# Patient Record
Sex: FEMALE | Race: WHITE | NOT HISPANIC OR LATINO | Employment: OTHER | URBAN - METROPOLITAN AREA
[De-identification: names, ages, dates, MRNs, and addresses within clinical notes are randomized per-mention and may not be internally consistent; named-entity substitution may affect disease eponyms.]

---

## 2017-06-29 ENCOUNTER — HOSPITAL ENCOUNTER (EMERGENCY)
Facility: HOSPITAL | Age: 67
Discharge: HOME | End: 2017-06-30
Attending: EMERGENCY MEDICINE
Payer: MEDICARE

## 2017-06-29 DIAGNOSIS — E87.6 HYPOKALEMIA: ICD-10-CM

## 2017-06-29 DIAGNOSIS — N12 PYELONEPHRITIS: Primary | ICD-10-CM

## 2017-06-29 LAB
ALBUMIN SERPL BCP-MCNC: 2.8 G/DL
ALP SERPL-CCNC: 105 U/L
ALT SERPL W/O P-5'-P-CCNC: 20 U/L
ANION GAP SERPL CALC-SCNC: 10 MMOL/L
AST SERPL-CCNC: 17 U/L
BACTERIA #/AREA URNS HPF: ABNORMAL /HPF
BASOPHILS # BLD AUTO: 0.02 K/UL
BASOPHILS NFR BLD: 0.1 %
BILIRUB SERPL-MCNC: 1 MG/DL
BILIRUB UR QL STRIP: NEGATIVE
BUN SERPL-MCNC: 21 MG/DL
CALCIUM SERPL-MCNC: 9.9 MG/DL
CHLORIDE SERPL-SCNC: 105 MMOL/L
CLARITY UR: ABNORMAL
CO2 SERPL-SCNC: 27 MMOL/L
COLOR UR: ABNORMAL
CREAT SERPL-MCNC: 1.2 MG/DL
DIFFERENTIAL METHOD: ABNORMAL
EOSINOPHIL # BLD AUTO: 0 K/UL
EOSINOPHIL NFR BLD: 0.1 %
ERYTHROCYTE [DISTWIDTH] IN BLOOD BY AUTOMATED COUNT: 13.2 %
EST. GFR  (AFRICAN AMERICAN): 54 ML/MIN/1.73 M^2
EST. GFR  (NON AFRICAN AMERICAN): 47 ML/MIN/1.73 M^2
GLUCOSE SERPL-MCNC: 130 MG/DL
GLUCOSE UR QL STRIP: NEGATIVE
HCT VFR BLD AUTO: 35.5 %
HGB BLD-MCNC: 12.1 G/DL
HGB UR QL STRIP: ABNORMAL
HYALINE CASTS #/AREA URNS LPF: ABNORMAL /LPF
KETONES UR QL STRIP: NEGATIVE
LEUKOCYTE ESTERASE UR QL STRIP: ABNORMAL
LIPASE SERPL-CCNC: 8 U/L
LYMPHOCYTES # BLD AUTO: 0.7 K/UL
LYMPHOCYTES NFR BLD: 4.9 %
MCH RBC QN AUTO: 30.5 PG
MCHC RBC AUTO-ENTMCNC: 34.1 %
MCV RBC AUTO: 89 FL
MICROSCOPIC COMMENT: ABNORMAL
MONOCYTES # BLD AUTO: 1 K/UL
MONOCYTES NFR BLD: 7 %
NEUTROPHILS # BLD AUTO: 12.4 K/UL
NEUTROPHILS NFR BLD: 87.9 %
NITRITE UR QL STRIP: POSITIVE
PH UR STRIP: 5 [PH] (ref 5–8)
PLATELET # BLD AUTO: 179 K/UL
PMV BLD AUTO: 9.5 FL
POTASSIUM SERPL-SCNC: 2.7 MMOL/L
PROT SERPL-MCNC: 7.1 G/DL
PROT UR QL STRIP: ABNORMAL
RBC # BLD AUTO: 3.97 M/UL
RBC #/AREA URNS HPF: 12 /HPF (ref 0–4)
SODIUM SERPL-SCNC: 142 MMOL/L
SP GR UR STRIP: 1.02 (ref 1–1.03)
SQUAMOUS #/AREA URNS HPF: 9 /HPF
URN SPEC COLLECT METH UR: ABNORMAL
UROBILINOGEN UR STRIP-ACNC: ABNORMAL EU/DL
WBC # BLD AUTO: 14.19 K/UL
WBC #/AREA URNS HPF: >100 /HPF (ref 0–5)

## 2017-06-29 PROCEDURE — 25000003 PHARM REV CODE 250: Performed by: EMERGENCY MEDICINE

## 2017-06-29 PROCEDURE — 81000 URINALYSIS NONAUTO W/SCOPE: CPT

## 2017-06-29 PROCEDURE — 96365 THER/PROPH/DIAG IV INF INIT: CPT | Mod: 59

## 2017-06-29 PROCEDURE — 96361 HYDRATE IV INFUSION ADD-ON: CPT

## 2017-06-29 PROCEDURE — 87186 SC STD MICRODIL/AGAR DIL: CPT

## 2017-06-29 PROCEDURE — 80053 COMPREHEN METABOLIC PANEL: CPT

## 2017-06-29 PROCEDURE — 87086 URINE CULTURE/COLONY COUNT: CPT

## 2017-06-29 PROCEDURE — 63600175 PHARM REV CODE 636 W HCPCS: Performed by: EMERGENCY MEDICINE

## 2017-06-29 PROCEDURE — 87088 URINE BACTERIA CULTURE: CPT

## 2017-06-29 PROCEDURE — 96375 TX/PRO/DX INJ NEW DRUG ADDON: CPT

## 2017-06-29 PROCEDURE — 83690 ASSAY OF LIPASE: CPT

## 2017-06-29 PROCEDURE — 99285 EMERGENCY DEPT VISIT HI MDM: CPT | Mod: 25

## 2017-06-29 PROCEDURE — 85025 COMPLETE CBC W/AUTO DIFF WBC: CPT

## 2017-06-29 PROCEDURE — 87077 CULTURE AEROBIC IDENTIFY: CPT

## 2017-06-29 RX ORDER — AZITHROMYCIN 250 MG/1
250 TABLET, FILM COATED ORAL DAILY
COMMUNITY
End: 2020-12-11 | Stop reason: CLARIF

## 2017-06-29 RX ORDER — ACETAMINOPHEN 500 MG
1000 TABLET ORAL
Status: COMPLETED | OUTPATIENT
Start: 2017-06-29 | End: 2017-06-29

## 2017-06-29 RX ORDER — METHYLPREDNISOLONE SODIUM SUCCINATE 125 MG/2ML
125 INJECTION INTRAMUSCULAR; INTRAVENOUS
Status: COMPLETED | OUTPATIENT
Start: 2017-06-29 | End: 2017-06-29

## 2017-06-29 RX ORDER — GLUCOSAMINE/CHONDRO SU A 500-400 MG
1 TABLET ORAL 3 TIMES DAILY
COMMUNITY
End: 2020-12-11 | Stop reason: CLARIF

## 2017-06-29 RX ORDER — DIPHENHYDRAMINE HYDROCHLORIDE 50 MG/ML
25 INJECTION INTRAMUSCULAR; INTRAVENOUS
Status: COMPLETED | OUTPATIENT
Start: 2017-06-29 | End: 2017-06-29

## 2017-06-29 RX ADMIN — SODIUM CHLORIDE 1000 ML: 0.9 INJECTION, SOLUTION INTRAVENOUS at 09:06

## 2017-06-29 RX ADMIN — IOHEXOL 70 ML: 350 INJECTION, SOLUTION INTRAVENOUS at 11:06

## 2017-06-29 RX ADMIN — ACETAMINOPHEN 1000 MG: 500 TABLET ORAL at 09:06

## 2017-06-29 RX ADMIN — DIPHENHYDRAMINE HYDROCHLORIDE 25 MG: 50 INJECTION, SOLUTION INTRAMUSCULAR; INTRAVENOUS at 11:06

## 2017-06-29 RX ADMIN — METHYLPREDNISOLONE SODIUM SUCCINATE 125 MG: 125 INJECTION, POWDER, FOR SOLUTION INTRAMUSCULAR; INTRAVENOUS at 11:06

## 2017-06-30 VITALS
BODY MASS INDEX: 36.37 KG/M2 | SYSTOLIC BLOOD PRESSURE: 129 MMHG | HEART RATE: 60 BPM | OXYGEN SATURATION: 95 % | RESPIRATION RATE: 18 BRPM | TEMPERATURE: 99 F | WEIGHT: 213 LBS | HEIGHT: 64 IN | DIASTOLIC BLOOD PRESSURE: 87 MMHG

## 2017-06-30 PROCEDURE — 63600175 PHARM REV CODE 636 W HCPCS: Performed by: EMERGENCY MEDICINE

## 2017-06-30 PROCEDURE — 25500020 PHARM REV CODE 255: Performed by: EMERGENCY MEDICINE

## 2017-06-30 RX ORDER — CEPHALEXIN 500 MG/1
500 CAPSULE ORAL EVERY 8 HOURS
Qty: 21 CAPSULE | Refills: 0 | Status: SHIPPED | OUTPATIENT
Start: 2017-06-30 | End: 2017-07-07

## 2017-06-30 RX ORDER — ONDANSETRON 4 MG/1
4 TABLET, ORALLY DISINTEGRATING ORAL EVERY 8 HOURS PRN
Qty: 12 TABLET | Refills: 0 | Status: SHIPPED | OUTPATIENT
Start: 2017-06-30 | End: 2020-12-11 | Stop reason: CLARIF

## 2017-06-30 RX ADMIN — CEFTRIAXONE 1 G: 1 INJECTION, SOLUTION INTRAVENOUS at 01:06

## 2017-06-30 NOTE — ED PROVIDER NOTES
"Encounter Date: 6/29/2017    SCRIBE #1 NOTE: I, Maritza Connelly, am scribing for, and in the presence of,  Leandro Kent MD. I have scribed the following portions of the note - Other sections scribed: HPI/ROS.       History     Chief Complaint   Patient presents with    Fever     fever off and on x 2 days with abd pain and belching. Denies N/V. Dx with URI yesterday and on zithromax rx.     Abdominal Pain     CC: Abdominal Pain    HPI: 67 y.o. F with a PMHx of arthritis, HLD, HTN, pulmonary HTN, leukemia, and sleep apnea presents to the ED c/o moderate epigastric abdominal pain with associated "dry heaves" (occasionally), decreased appetite, belching, and increased flatulence x 2 days. Pt states she feels hungry but "doesn't want it" once she starts eating. Pt also reports of chills, an intermittent fever, and slight post-nasal drip. Pt was dxed with URI and started on Z-franklin yesterday by her PCP. Pt denies H/O acid reflux. Pt otherwise denies emesis, nausea, CP, diarrhea, and new urinary symptoms.        The history is provided by the patient.     Review of patient's allergies indicates:   Allergen Reactions    Amoxicillin Rash     Past Medical History:   Diagnosis Date    Arthritis     Hyperlipidemia     Hypertension     Leukemia     Pulmonary HTN     Sleep apnea 2011    pt usuesa sleep apnea machine     Past Surgical History:   Procedure Laterality Date    bladder lift      BREAST SURGERY      CHOLECYSTECTOMY      HYSTERECTOMY      TONSILLECTOMY      TOTAL HIP ARTHROPLASTY       History reviewed. No pertinent family history.  Social History   Substance Use Topics    Smoking status: Never Smoker    Smokeless tobacco: Never Used    Alcohol use No     Review of Systems   Constitutional: Positive for appetite change, chills and fever.   HENT: Positive for postnasal drip. Negative for congestion, ear pain and sore throat.         (+) occasional "dry heaving" without nausea   Eyes: Negative for pain. "   Respiratory: Negative for cough and shortness of breath.    Cardiovascular: Negative for chest pain.   Gastrointestinal: Positive for abdominal pain. Negative for blood in stool, diarrhea, nausea and vomiting.   Genitourinary: Negative for dysuria and hematuria.   Musculoskeletal: Negative for neck stiffness.   Skin: Negative for rash.   Neurological: Negative for headaches.       Physical Exam     Initial Vitals [06/29/17 1922]   BP Pulse Resp Temp SpO2   132/68 88 20 99.7 °F (37.6 °C) 97 %      MAP       89.33         Physical Exam    Nursing note and vitals reviewed.  Constitutional: She appears well-developed and well-nourished. She is not diaphoretic. No distress.   HENT:   Head: Normocephalic and atraumatic.   Nose: Nose normal.   Mouth/Throat: Oropharynx is clear and moist. No oropharyngeal exudate.   Eyes: Conjunctivae and EOM are normal. Pupils are equal, round, and reactive to light. No scleral icterus.   Neck: Normal range of motion. Neck supple. No thyromegaly present. No tracheal deviation present.   Cardiovascular: Normal rate, regular rhythm and normal heart sounds. Exam reveals no gallop and no friction rub.    No murmur heard.  Pulmonary/Chest: Breath sounds normal. No respiratory distress. She has no wheezes. She has no rhonchi. She has no rales.   Abdominal: Soft. Bowel sounds are normal. She exhibits no distension and no mass. There is tenderness (epigastric only). There is no rebound and no guarding.   Musculoskeletal: Normal range of motion. She exhibits no edema or tenderness.   Lymphadenopathy:     She has no cervical adenopathy.   Neurological: She is alert and oriented to person, place, and time. She has normal strength. No cranial nerve deficit or sensory deficit.   Skin: Skin is warm and dry. No rash noted. No erythema. No pallor.   Psychiatric: She has a normal mood and affect. Her behavior is normal. Thought content normal.         ED Course   Procedures  Labs Reviewed   CBC W/ AUTO  "DIFFERENTIAL - Abnormal; Notable for the following:        Result Value    WBC 14.19 (*)     RBC 3.97 (*)     Hematocrit 35.5 (*)     Gran # 12.4 (*)     Lymph # 0.7 (*)     Gran% 87.9 (*)     Lymph% 4.9 (*)     All other components within normal limits   COMPREHENSIVE METABOLIC PANEL - Abnormal; Notable for the following:     Potassium 2.7 (*)     Glucose 130 (*)     Albumin 2.8 (*)     eGFR if  54 (*)     eGFR if non  47 (*)     All other components within normal limits    Narrative:      Potassium  critical result(s) called and verbal readback obtained   from Liset Crowe, 06/29/2017 22:54   URINALYSIS - Abnormal; Notable for the following:     Appearance, UA Cloudy (*)     Protein, UA 2+ (*)     Occult Blood UA 3+ (*)     Nitrite, UA Positive (*)     Urobilinogen, UA 4.0-6.0 (*)     Leukocytes, UA 3+ (*)     All other components within normal limits   URINALYSIS MICROSCOPIC - Abnormal; Notable for the following:     RBC, UA 12 (*)     WBC, UA >100 (*)     Bacteria, UA Many (*)     All other components within normal limits   CULTURE, URINE   LIPASE             Medical Decision Making:   Initial Assessment:   67-year-old female presents complaining of epigastric pain with what she describes as "dry heaving".  She does state the small amount of liquid comes up with her dry heaves.  Physical examination reveals mild epigastric tenderness with no other significant abnormality.   Differential Diagnosis:   Broad, includes gastritis, peptic ulcer disease, mesenteric ischemia, small bowel obstruction, enteritis  Independently Interpreted Test(s):   I have ordered and independently interpreted X-rays - see summary below.       <> Summary of X-Ray Reading(s): CT abdomen: Findings consistent with left-sided pyelonephritis  ED Management:  Workup reveals nitrite positive urinary tract infection along with mild leukocytosis and findings consistent with pyelonephritis on CT.  Patient reports that " she feels much better after treatment in the emergency department.  Her vital signs are normal, she is well-appearing, she denies pain or nausea.  I do not believe that she requires treatment in the hospital for this condition given the many reassuring findings above.  I will discharge with prescription for antibiotic and anti-medic, along with recommendations of increased potassium intake, copious oral fluids, and with return precautions.    Patient counseled regarding test results, recommendations for supportive care, and need for follow-up.  Return precautions given.              Scribe Attestation:   Scribe #1: I performed the above scribed service and the documentation accurately describes the services I performed. I attest to the accuracy of the note.    Attending Attestation:           Physician Attestation for Scribe:  Physician Attestation Statement for Scribe #1: I, Leandro Kent MD , reviewed documentation, as scribed by Maritza Connelly in my presence, and it is both accurate and complete.                 ED Course     Clinical Impression:   The primary encounter diagnosis was Pyelonephritis. A diagnosis of Hypokalemia was also pertinent to this visit.                           Leandro Kent III, MD  06/30/17 0512

## 2017-06-30 NOTE — DISCHARGE INSTRUCTIONS
You may take up to 800 mg of ibuprofen every 8 hours as needed for pain or fever.  You may also take up to 1000 mg of Tylenol every 6 hours as needed for pain or fever.  Return to the emergency department if you develop worsening pain, persistent vomiting, or for any new or worsening medical concerns.

## 2017-06-30 NOTE — ED TRIAGE NOTES
Pt c/o fever, gas, belching, and abdominal pain for the past 2 days.  Denies Vomiting and diarrhea, does report dry heaving.  Dx with URI yesterday and on zithromax rx.

## 2017-07-01 LAB — BACTERIA UR CULT: NORMAL

## 2018-09-27 DIAGNOSIS — I67.2 CEREBRAL ATHEROSCLEROSIS: Primary | ICD-10-CM

## 2018-11-05 ENCOUNTER — HOSPITAL ENCOUNTER (OUTPATIENT)
Dept: RADIOLOGY | Facility: HOSPITAL | Age: 68
Discharge: HOME | End: 2018-11-05
Attending: FAMILY MEDICINE
Payer: MEDICARE

## 2018-11-05 DIAGNOSIS — I67.2 CEREBRAL ATHEROSCLEROSIS: ICD-10-CM

## 2018-11-05 PROCEDURE — 93880 EXTRACRANIAL BILAT STUDY: CPT | Mod: 26,,, | Performed by: RADIOLOGY

## 2018-11-05 PROCEDURE — 93880 EXTRACRANIAL BILAT STUDY: CPT | Mod: TC

## 2018-11-07 ENCOUNTER — TELEPHONE (OUTPATIENT)
Dept: OTOLARYNGOLOGY | Facility: CLINIC | Age: 68
End: 2018-11-07

## 2018-11-07 NOTE — TELEPHONE ENCOUNTER
----- Message from Liudmila Randall sent at 11/7/2018  9:56 AM CST -----  Contact: Self/ [phone]  Pt calling to request urgent appt today. Says she has been taking medicine all week. Thinks it is a bad sinus infection with a sore throat. Please call to advise. Thank you.

## 2018-11-07 NOTE — TELEPHONE ENCOUNTER
----- Message from Jason Fox sent at 11/7/2018 11:48 AM CST -----  Contact: Self/[phone]  The patient would like someone to contact her at [phone]. The patient stated she would like a sooner appt.        Thank you

## 2018-11-07 NOTE — TELEPHONE ENCOUNTER
Notified patient that we have no availible appt today, soonest is the end of the month. Advised her to see her pcp or urgent care   patient

## 2018-12-11 ENCOUNTER — TELEPHONE (OUTPATIENT)
Dept: OTOLARYNGOLOGY | Facility: CLINIC | Age: 68
End: 2018-12-11

## 2018-12-11 NOTE — TELEPHONE ENCOUNTER
----- Message from Amelia Kendrick sent at 12/11/2018 10:04 AM CST -----  Contact: Carol [phone]  The patient is requesting an appointment with Dr. Soto as soon as possible. She reports that she may have a sinus infection. Please call at your earliest convenience.

## 2018-12-11 NOTE — TELEPHONE ENCOUNTER
Called patient and advised her to see her PCP or Urgent Care due to not having any appts for today or tomorrow.

## 2019-08-20 ENCOUNTER — HOSPITAL ENCOUNTER (EMERGENCY)
Facility: HOSPITAL | Age: 69
Discharge: HOME | End: 2019-08-20
Attending: EMERGENCY MEDICINE
Payer: MEDICARE

## 2019-08-20 VITALS
RESPIRATION RATE: 19 BRPM | DIASTOLIC BLOOD PRESSURE: 60 MMHG | WEIGHT: 225 LBS | SYSTOLIC BLOOD PRESSURE: 126 MMHG | BODY MASS INDEX: 38.41 KG/M2 | HEART RATE: 55 BPM | TEMPERATURE: 99 F | OXYGEN SATURATION: 96 % | HEIGHT: 64 IN

## 2019-08-20 DIAGNOSIS — R60.9 SWELLING: Primary | ICD-10-CM

## 2019-08-20 PROCEDURE — 99284 EMERGENCY DEPT VISIT MOD MDM: CPT | Mod: 25

## 2019-08-20 RX ORDER — FUROSEMIDE 20 MG/1
20 TABLET ORAL 2 TIMES DAILY
COMMUNITY
End: 2020-12-11 | Stop reason: CLARIF

## 2019-08-20 NOTE — ED PROVIDER NOTES
"Encounter Date: 8/20/2019    SCRIBE #1 NOTE: I, Carson Miramontes, am scribing for, and in the presence of,  Wilfredo Huynh MD. I have scribed the following portions of the note - Other sections scribed: HPI, ROS, PE, DD.       History     Chief Complaint   Patient presents with    Leg Swelling     Pt reports left leg swelling and pain behind left knee     CC: Leg Swelling     HPI: This 69 y.o F with a hx of Arthritis, HLD, HTN, Leukemia and Pulmonary HTN presents to the ED from Urgent Care c/o acute onset of gradually worsening and LLE swelling with associated intermittent and moderate (7/10) L posterior knee pain. She also notes noticing a "knot" on the posterior L knee last night. Her pain is worse when attempting to ambulate and with L foot flexion. She was recently administered an injection by Orthopedics which she states worsened her chronic knee pain. She also notes that she traveled to and from [place] this weekend. No recent surgeries. She is compliant with her Rx'ed Lasix. The pt denies hip pain, abdominal pain, chest pain, worsening SOB, fever, dysuria and rash. No prior tx.    The history is provided by the patient. No  was used.     Review of patient's allergies indicates:   Allergen Reactions    Amoxicillin Rash     Past Medical History:   Diagnosis Date    Arthritis     Hyperlipidemia     Hypertension     Leukemia     Pulmonary HTN     Sleep apnea 2011    pt usuesa sleep apnea machine     Past Surgical History:   Procedure Laterality Date    bladder lift      BREAST SURGERY      CHOLECYSTECTOMY      HYSTERECTOMY      leukemia      TONSILLECTOMY      TOTAL HIP ARTHROPLASTY       History reviewed. No pertinent family history.  Social History     Tobacco Use    Smoking status: Never Smoker    Smokeless tobacco: Never Used   Substance Use Topics    Alcohol use: No    Drug use: No     Review of Systems   Constitutional: Negative for fever.   Respiratory: Negative " for shortness of breath.    Cardiovascular: Negative for chest pain.   Gastrointestinal: Negative for abdominal pain.   Musculoskeletal:        (+) LLE swelling  (+) LLE pain  (+) L posterior knee pain  (-) hip pain   Skin: Negative for rash.       Physical Exam     Initial Vitals [08/20/19 1151]   BP Pulse Resp Temp SpO2   (!) 173/77 64 20 98.1 °F (36.7 °C) 99 %      MAP       --         Physical Exam    Nursing note and vitals reviewed.  Constitutional: She is not diaphoretic. No distress.   HENT:   Head: Normocephalic and atraumatic.   Mouth/Throat: Oropharynx is clear and moist.   Eyes: EOM are normal. Pupils are equal, round, and reactive to light. No scleral icterus.   Neck: Normal range of motion. Neck supple. No JVD present.   Cardiovascular: Normal rate, regular rhythm and intact distal pulses.   Pulses:       Femoral pulses are 2+ on the right side, and 2+ on the left side.       Dorsalis pedis pulses are 2+ on the right side, and 2+ on the left side.   Pulmonary/Chest: Breath sounds normal. No stridor. No respiratory distress.   Abdominal: Soft. Bowel sounds are normal. She exhibits no distension. There is no tenderness.   Musculoskeletal: Normal range of motion. She exhibits no edema or tenderness.   1+ pitting edema in the LLE    Full ROM of L knee and L hip     Tend over left lateral knee joint    No surrounding erythema   Neurological: She is alert and oriented to person, place, and time. She has normal strength. No cranial nerve deficit or sensory deficit.   Skin: Skin is warm and dry.   Psychiatric: She has a normal mood and affect.         ED Course   Procedures  Labs Reviewed - No data to display       Imaging Results          US Lower Extremity Veins Left (Final result)  Result time 08/20/19 13:10:00    Final result by Job Marcelo MD (08/20/19 13:10:00)                 Impression:      No evidence of deep venous thrombosis in the left lower extremity.      Electronically signed by: Job  MD Fozia  Date:    08/20/2019  Time:    13:10             Narrative:    EXAMINATION:  US LOWER EXTREMITY VEINS LEFT    CLINICAL HISTORY:  Edema, unspecified    TECHNIQUE:  Duplex and color flow Doppler evaluation and graded compression of the left lower extremity veins was performed.    COMPARISON:  None    FINDINGS:  Duplex and color flow Doppler evaluation does not reveal any evidence of acute venous thrombosis in the common femoral, superficial femoral, greater saphenous, popliteal, peroneal, anterior tibial and posterior tibial veins of the left lower extremity.  There is no reflux to suggest valvular incompetence.                                 Medical Decision Making:   History:   Old Medical Records: I decided to obtain old medical records.      MDM:    69 y.o.female with PMHx as noted above presents with LLE swelling and pain. Physical exam remarkable for well appearing female, gait normal, ttp over lateral left knee, without overlying skin changes, 2+ distal pulses and mild 1+ swelling in LLE when compared to RLE.  ED workup remarkable for u/s of LLE - negative for DVT.  Pt presentation consistent with LLE swelling likely 2/2 dependent edema, recent vacation with h/o similar prior episodes and noted total left hip arthroplasty previously. At this time given patient's history, physical exam, and ED workup do not suspect DVT, MI/ACS, PE,  CHF exacerbation, septic arthritis, or any further malignant cause.  Pt advised on compression stockings and elevation.  Discussed diagnosis and further treatment with patient, return precautions given and all questions answered.  Patient in understanding of plan.  Pt discharged to home improved and stable.          Scribe Attestation:   Scribe #1: I performed the above scribed service and the documentation accurately describes the services I performed. I attest to the accuracy of the note.    I, Wilfredo Huynh M.D., personally performed the services described in  this documentation. All medical record entries made by the scribe were at my direction and in my presence.  I have reviewed the chart and agree that the record reflects my personal performance and is accurate and complete.             Clinical Impression:       ICD-10-CM ICD-9-CM   1. Swelling R60.9 782.3                                Wilfredo Huynh MD  08/21/19 1916

## 2019-12-05 PROBLEM — M85.80 OSTEOPENIA: Status: ACTIVE | Noted: 2019-12-05

## 2020-08-14 NOTE — ED TRIAGE NOTES
Pain in left leg for several days, worse yesterday.  Swelling in calf with feeling of know behind the knee.  Pain for over a month but gradually worse.  Traveled to [facility], [place]  Dr. Huynh visited. Monitor on.   Vascular Surgery General Surgery

## 2020-12-11 ENCOUNTER — HOSPITAL ENCOUNTER (EMERGENCY)
Facility: HOSPITAL | Age: 70
Discharge: HOME | End: 2020-12-11
Attending: EMERGENCY MEDICINE
Payer: MEDICARE

## 2020-12-11 VITALS
TEMPERATURE: 99 F | HEART RATE: 66 BPM | WEIGHT: 220 LBS | SYSTOLIC BLOOD PRESSURE: 170 MMHG | BODY MASS INDEX: 37.56 KG/M2 | OXYGEN SATURATION: 99 % | RESPIRATION RATE: 20 BRPM | DIASTOLIC BLOOD PRESSURE: 75 MMHG | HEIGHT: 64 IN

## 2020-12-11 DIAGNOSIS — M25.562 LEFT KNEE PAIN: Primary | ICD-10-CM

## 2020-12-11 DIAGNOSIS — R00.1 BRADYCARDIA: ICD-10-CM

## 2020-12-11 DIAGNOSIS — M25.562 ACUTE PAIN OF LEFT KNEE: ICD-10-CM

## 2020-12-11 LAB
ALBUMIN SERPL-MCNC: 3.9 G/DL (ref 3.3–5.5)
ALP SERPL-CCNC: 86 U/L (ref 42–141)
BILIRUB SERPL-MCNC: 0.6 MG/DL (ref 0.2–1.6)
BUN SERPL-MCNC: 18 MG/DL (ref 7–22)
CALCIUM SERPL-MCNC: 9.7 MG/DL (ref 8–10.3)
CHLORIDE SERPL-SCNC: 114 MMOL/L (ref 98–108)
CREAT SERPL-MCNC: 0.9 MG/DL (ref 0.6–1.2)
CTP QC/QA: YES
GLUCOSE SERPL-MCNC: 92 MG/DL (ref 73–118)
POC ALT (SGPT): 25 U/L (ref 10–47)
POC AST (SGOT): 26 U/L (ref 11–38)
POC B-TYPE NATRIURETIC PEPTIDE: 144 PG/ML (ref 0–100)
POC CARDIAC TROPONIN I: 0 NG/ML
POC TCO2: 27 MMOL/L (ref 18–33)
POTASSIUM BLD-SCNC: 3.9 MMOL/L (ref 3.6–5.1)
PROTEIN, POC: 7 G/DL (ref 6.4–8.1)
SAMPLE: NORMAL
SARS-COV-2 RDRP RESP QL NAA+PROBE: NEGATIVE
SODIUM BLD-SCNC: 143 MMOL/L (ref 128–145)

## 2020-12-11 PROCEDURE — 85025 COMPLETE CBC W/AUTO DIFF WBC: CPT | Mod: ER

## 2020-12-11 PROCEDURE — 93010 ELECTROCARDIOGRAM REPORT: CPT | Mod: ,,, | Performed by: INTERNAL MEDICINE

## 2020-12-11 PROCEDURE — 25000003 PHARM REV CODE 250: Mod: ER | Performed by: EMERGENCY MEDICINE

## 2020-12-11 PROCEDURE — 85610 PROTHROMBIN TIME: CPT | Mod: ER

## 2020-12-11 PROCEDURE — 83880 ASSAY OF NATRIURETIC PEPTIDE: CPT | Mod: ER

## 2020-12-11 PROCEDURE — 93005 ELECTROCARDIOGRAM TRACING: CPT | Mod: ER

## 2020-12-11 PROCEDURE — 96360 HYDRATION IV INFUSION INIT: CPT | Mod: ER

## 2020-12-11 PROCEDURE — 63600175 PHARM REV CODE 636 W HCPCS: Mod: ER | Performed by: NURSE PRACTITIONER

## 2020-12-11 PROCEDURE — U0002 COVID-19 LAB TEST NON-CDC: HCPCS | Mod: ER | Performed by: EMERGENCY MEDICINE

## 2020-12-11 PROCEDURE — 80053 COMPREHEN METABOLIC PANEL: CPT | Mod: ER

## 2020-12-11 PROCEDURE — 93010 EKG 12-LEAD: ICD-10-PCS | Mod: ,,, | Performed by: INTERNAL MEDICINE

## 2020-12-11 PROCEDURE — 84484 ASSAY OF TROPONIN QUANT: CPT | Mod: ER

## 2020-12-11 PROCEDURE — 99285 EMERGENCY DEPT VISIT HI MDM: CPT | Mod: 25,ER

## 2020-12-11 PROCEDURE — 96372 THER/PROPH/DIAG INJ SC/IM: CPT | Mod: 59,ER

## 2020-12-11 RX ORDER — HYDRALAZINE HYDROCHLORIDE 25 MG/1
25 TABLET, FILM COATED ORAL 2 TIMES DAILY
COMMUNITY
End: 2020-12-11 | Stop reason: SDUPTHER

## 2020-12-11 RX ORDER — HYDRALAZINE HYDROCHLORIDE 25 MG/1
25 TABLET, FILM COATED ORAL EVERY 8 HOURS
Qty: 30 TABLET | Refills: 0 | Status: SHIPPED | OUTPATIENT
Start: 2020-12-11

## 2020-12-11 RX ORDER — KETOROLAC TROMETHAMINE 30 MG/ML
15 INJECTION, SOLUTION INTRAMUSCULAR; INTRAVENOUS
Status: COMPLETED | OUTPATIENT
Start: 2020-12-11 | End: 2020-12-11

## 2020-12-11 RX ORDER — FOLIC ACID/MULTIVIT,IRON,MINER 0.4MG-18MG
25 TABLET ORAL
COMMUNITY

## 2020-12-11 RX ORDER — KETOROLAC TROMETHAMINE 30 MG/ML
30 INJECTION, SOLUTION INTRAMUSCULAR; INTRAVENOUS
Status: DISCONTINUED | OUTPATIENT
Start: 2020-12-11 | End: 2020-12-11

## 2020-12-11 RX ORDER — DICLOFENAC SODIUM 50 MG/1
50 TABLET, DELAYED RELEASE ORAL 3 TIMES DAILY PRN
Qty: 24 TABLET | Refills: 0 | Status: SHIPPED | OUTPATIENT
Start: 2020-12-11

## 2020-12-11 RX ORDER — PROPAFENONE HYDROCHLORIDE 150 MG/1
150 TABLET, COATED ORAL 2 TIMES DAILY
COMMUNITY
End: 2020-12-11

## 2020-12-11 RX ORDER — ASPIRIN 325 MG
325 TABLET ORAL
Status: COMPLETED | OUTPATIENT
Start: 2020-12-11 | End: 2020-12-11

## 2020-12-11 RX ORDER — CLONIDINE HYDROCHLORIDE 0.1 MG/1
0.1 TABLET ORAL 2 TIMES DAILY
COMMUNITY
End: 2020-12-11

## 2020-12-11 RX ADMIN — ASPIRIN 325 MG ORAL TABLET 325 MG: 325 PILL ORAL at 06:12

## 2020-12-11 RX ADMIN — KETOROLAC TROMETHAMINE 15 MG: 30 INJECTION, SOLUTION INTRAMUSCULAR at 05:12

## 2020-12-11 RX ADMIN — SODIUM CHLORIDE 500 ML: 0.9 INJECTION, SOLUTION INTRAVENOUS at 06:12

## 2020-12-11 NOTE — ED PROVIDER NOTES
Encounter Date: 12/11/2020    SCRIBE #1 NOTE: I, Beth Harpermarianne, am scribing for, and in the presence of,  DAMON Rodarte. I have scribed the following portions of the note - Other sections scribed: HPI, ROS, PE.   SCRIBE #2 NOTE: I, Asha Schuster Bill, am scribing for, and in the presence of,  Dr. Alejandra. I have scribed the following portions of the note - the EKG reading. Other sections scribed: MDM.     History     Chief Complaint   Patient presents with    Knee Pain     Pt to ER with c/o left knee pain x 2 days. Pt denies injury or trauma.      This is a nontoxic appearing 70 y.o. female who presents to the ED for evaluation of left knee pain and swelling for 2 days. Patient has been taking Ibuprofen twice a day with no improvement. Denies any recent injuries, traumas, or falls.  Patient has no other symptoms.  Patient denies chest pain, palpitations, shortness of breath, lightheadedness, dizziness, and weakness.    The history is provided by the patient. No  was used.   Knee Pain  This is a new problem. The current episode started 2 days ago. The problem has not changed since onset.Pertinent negatives include no chest pain and no shortness of breath.     Review of patient's allergies indicates:   Allergen Reactions    Amoxicillin Rash     Past Medical History:   Diagnosis Date    Arthritis     Hyperlipidemia     Hypertension     Leukemia     Pulmonary HTN     Sleep apnea 2011    pt usuesa sleep apnea machine     Past Surgical History:   Procedure Laterality Date    bladder lift      BREAST SURGERY      CHOLECYSTECTOMY      HYSTERECTOMY      leukemia      TONSILLECTOMY      TOTAL HIP ARTHROPLASTY       History reviewed. No pertinent family history.  Social History     Tobacco Use    Smoking status: Never Smoker    Smokeless tobacco: Never Used   Substance Use Topics    Alcohol use: No    Drug use: No     Review of Systems   Constitutional: Negative.  Negative for fever.    HENT: Negative.    Eyes: Negative.    Respiratory: Negative.  Negative for shortness of breath.    Cardiovascular: Negative.  Negative for chest pain.   Gastrointestinal: Negative.    Endocrine: Negative.    Genitourinary: Negative.    Musculoskeletal: Positive for arthralgias and joint swelling.   Skin: Negative.    Allergic/Immunologic: Negative.    Neurological: Negative.  Negative for weakness and numbness.   Hematological: Negative.    Psychiatric/Behavioral: Negative.    All other systems reviewed and are negative.      Physical Exam     Initial Vitals [12/11/20 1545]   BP Pulse Resp Temp SpO2   (!) 150/78 72 18 98.8 °F (37.1 °C) 97 %      MAP       --         Physical Exam    Nursing note and vitals reviewed.  Constitutional: She appears well-developed.   HENT:   Head: Normocephalic.   Nose: Nose normal.   Mouth/Throat: Oropharynx is clear and moist.   Eyes: Conjunctivae are normal.   Neck: Normal range of motion. Neck supple.   Cardiovascular: Normal rate, regular rhythm, S1 normal, S2 normal and normal heart sounds. Exam reveals no gallop and no friction rub.    No murmur heard.  Pulmonary/Chest: Breath sounds normal. No respiratory distress. She has no wheezes. She has no rhonchi. She has no rales.   Abdominal: Soft. There is no abdominal tenderness.   Musculoskeletal: Normal range of motion.      Left knee: She exhibits swelling. She exhibits normal range of motion. Tenderness found.   Neurological: She is alert and oriented to person, place, and time.   Skin: Skin is warm and dry. Capillary refill takes less than 2 seconds.   Psychiatric: She has a normal mood and affect. Her behavior is normal.         ED Course   Procedures  Labs Reviewed   POCT CMP - Abnormal; Notable for the following components:       Result Value    POC Chloride 114 (*)     All other components within normal limits   POCT B-TYPE NATRIURETIC PEPTIDE (BNP) - Abnormal; Notable for the following components:    POC B-Type Natriuretic  Peptide 144 (*)     All other components within normal limits   TROPONIN ISTAT   POCT CBC   SARS-COV-2 RDRP GENE   POCT CMP   POCT PROTIME-INR   POCT TROPONIN   POCT B-TYPE NATRIURETIC PEPTIDE (BNP)         EKG Readings: (Independently Interpreted)   No STEMI. Sinus Bradycardia with Bigeminal PVCs. Rate of 33. Normal Sinus Rhythm. Normal Axis. Abnormal EKG. QTc normal at 466. When compared to prior EKG dated 9/14/15 rate decreased by 48 bpm.    Other EKG Interpretations: No STEMI. Sinus Bradycardia with Bigeminal PVCs. Rate of 35. Normal Sinus Rhythm. Normal Axis. Abnormal EKG. QTc normal at 466. When compared to prior EKG at 17:54 rate increased by 2 bpm.          Imaging Results          X-Ray Chest PA And Lateral (Final result)  Result time 12/11/20 19:29:10    Final result by Elisha Acosta MD (12/11/20 19:29:10)                 Impression:      No acute intrathoracic abnormality.      Electronically signed by: Elisha Acosta  Date:    12/11/2020  Time:    19:29             Narrative:    EXAMINATION:  CHEST PA AND LATERAL    CLINICAL HISTORY:  Bradycardia;    TECHNIQUE:  PA and lateral chest radiograph    COMPARISON:  09/14/2015    FINDINGS:  The cardiac silhouette is within normal limits.   There is no focal consolidation, pneumothorax, or pleural effusion.  There is mild asymmetric elevation of right hemidiaphragm.  Spondylitic changes are present.  The bones are osteopenic.  Cholecystectomy clips are present.                               X-Ray Knee 3 View Left (Final result)  Result time 12/11/20 16:12:44   Procedure changed from X-Ray Knee 1 or 2 View Left     Final result by Job Marcelo MD (12/11/20 16:12:44)                 Impression:      1. No acute displaced fracture or dislocation of the knee.      Electronically signed by: Job Marcelo MD  Date:    12/11/2020  Time:    16:12             Narrative:    EXAMINATION:  XR KNEE 3 VIEW LEFT    CLINICAL HISTORY:  pain; Pain in left  knee    TECHNIQUE:  AP, lateral, and Merchant views of the left knee were performed.    COMPARISON:  None    FINDINGS:  Three views left knee.    Degenerative changes are noted of the knee including meniscal calcification.  There is osteopenia.  No acute displaced fracture or dislocation of the knee.  No radiopaque foreign body.                                 Medical Decision Making:   History:   Old Medical Records: I decided to obtain old medical records.  Initial Assessment:   This is a nontoxic appearing 70 y.o. female who presents to the ED for evaluation of left knee pain and swelling for 2 days. Patient has been taking Ibuprofen twice a day with no improvement. Denies any recent injuries, traumas, or falls.  Differential Diagnosis:   Strain, sprain, fracture, effusion. Bradycardia, Acute MI, CHF   Independently Interpreted Test(s):   I have ordered and independently interpreted X-rays - see prior notes.  I have ordered and independently interpreted EKG Reading(s) - see prior notes  Clinical Tests:   Lab Tests: Ordered and Reviewed  Radiological Study: Ordered and Reviewed  Medical Tests: Ordered and Reviewed  ED Management:  Physical exam performed.  Left knee x-ray with no acute findings.  1754 Vital signs rechecked. HR 32. Pt asymptomatic.  Labs wnl.   Patient to follow-up with cardiologist on Monday.  Patient instructed to discontinue clonidine and  Propafenone.   Return to ED for weakness, dizziness, or l lightheadedness.  Patient verbalized understanding.  I have reviewed the notes, assessments, and/or procedures performed by NP/PA, I concur with her/his documentation of Carol Lauren.  Attending:   Physician Attestation Statement: I have reviewed this case with my non-physician provider.   Physician Attestation Statement: I have provided a face to face evaluation of this patient at the request of my non-physician provider.  I agree with the HPI, review of systems, and physical exam, as documented.   The patient's condition warranted physician involvement.  Other Attend Additions:   Physical Exam: Awake alert oriented ×4 speaking clearly in full sentences.    Regular rate and rhythm no murmur gallop or rub.   Clear to auscultation bilateral without wheeze crackles or Rales   Abdomen soft, nontender, nondistended. Bowel sounds ×4.   She exhibits tenderness and swelling   Pulses palpable ×4 no clubbing cyanosis or edema.   Skin no rash, no wound.     Medical Decision Making:   I reviewed radiology and Lab Data.  The treatment regimen was reviewed by me.  Patient reports feeling better after treatment in the emergency room.  I agree with management as documented.    6:50 pm:  I consulted Cardiology Dr. Demarco regarding Ms. Lauren's sinus bradycardia (rate of 33 at 17:54) with Bigeminal PVCs. As the patient is asymptomatic, he recommends holding clonidine and propafenone and following up with cardiology next Monday. Return immediately if she experiences dizziness or symptoms worsen.  Had lengthy discussion with patient that as she has no symptoms she may be discharged home.  Patient is return immediately to the ED if she has any lightheadedness dizziness weakness or does not feel fine.  Patient is to follow up with her cardiologist 1st thing in the morning.            Scribe Attestation:   Scribe #1: I performed the above scribed service and the documentation accurately describes the services I performed. I attest to the accuracy of the note.     I, Dr. Millie Alejandra, personally performed the services described in this documentation. This document was produced by a scribe under my direction and in my presence. All medical record entries made by the scribe were at my direction and in my presence.  I have reviewed the chart and agree that the record reflects my personal performance and is accurate and complete. Millie Alejandra, DO.     12/11/2020 7:05 PM                    Clinical Impression:       ICD-10-CM  ICD-9-CM   1. Left knee pain  M25.562 719.46   2. Acute pain of left knee  M25.562 719.46   3. Bradycardia  R00.1 427.89                          ED Disposition Condition    Discharge Stable        ED Prescriptions     Medication Sig Dispense Start Date End Date Auth. Provider    diclofenac (VOLTAREN) 50 MG EC tablet Take 1 tablet (50 mg total) by mouth 3 (three) times daily as needed (pain). 24 tablet 12/11/2020  DAMON Jaeger    hydrALAZINE (APRESOLINE) 25 MG tablet Take 1 tablet (25 mg total) by mouth every 8 (eight) hours. 30 tablet 12/11/2020  DAMON Jaeger        Follow-up Information     Follow up With Specialties Details Why Contact Info    [facility] Orthopedics In 3 days  [address] [facility] [id number] [phone]    Pio Pedro MD Family Medicine In 2 days  [address] [address] [phone]                                         DAMON Jaeger  12/11/20 [id number]

## 2020-12-11 NOTE — FIRST PROVIDER EVALUATION
Emergency Department TeleTriage Encounter Note      CHIEF COMPLAINT    Chief Complaint   Patient presents with    Knee Pain     Pt to ER with c/o left knee pain x 2 days. Pt denies injury or trauma.        VITAL SIGNS   Initial Vitals [12/11/20 1545]   BP Pulse Resp Temp SpO2   (!) 150/78 72 18 98.8 °F (37.1 °C) 97 %      MAP       --            ALLERGIES    Review of patient's allergies indicates:   Allergen Reactions    Amoxicillin Rash       PROVIDER TRIAGE NOTE  Patient is a 70-year-old female who complains of left knee pain for 2 days.  She reports history of left knee problems approximately year ago when she was told she had a small tear in her meniscus and underwent therapy.  She denies any new injury.      ORDERS  Labs Reviewed - No data to display    ED Orders (720h ago, onward)    None            Virtual Visit Note: The provider triage portion of this emergency department evaluation and documentation was performed via [employer], a HIPAA-compliant telemedicine application, in concert with a tele-presenter in the room. A face to face patient evaluation with one of my colleagues will occur once the patient is placed in an emergency department room.      DISCLAIMER: This note was prepared with [employer] voice recognition transcription software. Garbled syntax, mangled pronouns, and other bizarre constructions may be attributed to that software system.

## 2020-12-11 NOTE — DISCHARGE INSTRUCTIONS
Follow-up with ortho in 3 days. Discontinue clonidine, propafenone.   Increase hydralazine 25 mg three times a day. Monitor B/P twice day.

## 2024-07-04 ENCOUNTER — HOSPITAL ENCOUNTER (EMERGENCY)
Facility: HOSPITAL | Age: 74
Discharge: HOME | End: 2024-07-04
Attending: EMERGENCY MEDICINE
Payer: MEDICARE

## 2024-07-04 VITALS
TEMPERATURE: 99 F | HEART RATE: 74 BPM | RESPIRATION RATE: 16 BRPM | SYSTOLIC BLOOD PRESSURE: 131 MMHG | OXYGEN SATURATION: 98 % | DIASTOLIC BLOOD PRESSURE: 100 MMHG

## 2024-07-04 DIAGNOSIS — M62.838 CERVICAL PARASPINOUS MUSCLE SPASM: Primary | ICD-10-CM

## 2024-07-04 DIAGNOSIS — M54.2 NECK PAIN: ICD-10-CM

## 2024-07-04 PROCEDURE — 63600175 PHARM REV CODE 636 W HCPCS: Mod: JZ | Performed by: PHYSICIAN ASSISTANT

## 2024-07-04 PROCEDURE — 99284 EMERGENCY DEPT VISIT MOD MDM: CPT | Mod: 25

## 2024-07-04 PROCEDURE — 96372 THER/PROPH/DIAG INJ SC/IM: CPT | Performed by: PHYSICIAN ASSISTANT

## 2024-07-04 RX ORDER — METHOCARBAMOL 750 MG/1
750 TABLET, FILM COATED ORAL 3 TIMES DAILY PRN
Qty: 20 TABLET | Refills: 0 | Status: SHIPPED | OUTPATIENT
Start: 2024-07-04 | End: 2024-07-11

## 2024-07-04 RX ORDER — LIDOCAINE 50 MG/G
1 PATCH TOPICAL DAILY
Qty: 15 PATCH | Refills: 0 | Status: SHIPPED | OUTPATIENT
Start: 2024-07-04 | End: 2024-07-19

## 2024-07-04 RX ORDER — PREDNISONE 20 MG/1
60 TABLET ORAL DAILY
Qty: 12 TABLET | Refills: 0 | Status: SHIPPED | OUTPATIENT
Start: 2024-07-05 | End: 2024-07-09

## 2024-07-04 RX ORDER — KETOROLAC TROMETHAMINE 30 MG/ML
15 INJECTION, SOLUTION INTRAMUSCULAR; INTRAVENOUS
Status: COMPLETED | OUTPATIENT
Start: 2024-07-04 | End: 2024-07-04

## 2024-07-04 RX ORDER — MORPHINE SULFATE 4 MG/ML
4 INJECTION, SOLUTION INTRAMUSCULAR; INTRAVENOUS
Status: COMPLETED | OUTPATIENT
Start: 2024-07-04 | End: 2024-07-04

## 2024-07-04 RX ORDER — PREDNISONE 20 MG/1
60 TABLET ORAL
Status: COMPLETED | OUTPATIENT
Start: 2024-07-04 | End: 2024-07-04

## 2024-07-04 RX ORDER — MORPHINE SULFATE 4 MG/ML
6 INJECTION, SOLUTION INTRAMUSCULAR; INTRAVENOUS
Status: DISCONTINUED | OUTPATIENT
Start: 2024-07-04 | End: 2024-07-04

## 2024-07-04 RX ADMIN — PREDNISONE 60 MG: 20 TABLET ORAL at 04:07

## 2024-07-04 RX ADMIN — MORPHINE SULFATE 4 MG: 4 INJECTION, SOLUTION INTRAMUSCULAR; INTRAVENOUS at 03:07

## 2024-07-04 RX ADMIN — KETOROLAC TROMETHAMINE 15 MG: 30 INJECTION, SOLUTION INTRAMUSCULAR at 03:07

## 2024-07-04 NOTE — ED PROVIDER NOTES
Encounter Date: 7/4/2024    SCRIBE #1 NOTE: I, Chuck Lau, am scribing for, and in the presence of,  Jeni Prajapati PA-C.       History     Chief Complaint   Patient presents with    Neck Pain     Reports neck pain x 1 week that got worse today. Reports taking oxycodone and heating pad with no relief. Hx arthritis.      CC: Neck pain    HPI:  74 y.o. female with PMHx of arthritis, HLD, HTN, leukemia, presents to the ED for evaluation of L sided neck pain and stiffness that started this morning. Patient reports symptoms starting after doing crosswords puzzles and puzzles, while looking down for a prolonged period of time. Denies a hx of similar presentation. Patient has not taken any medications PTA. Denies falls, traumas, CP, SOB, weakness, paresthesia, lightheadedness, dizziness or any associated symptoms.     The history is provided by the patient. No  was used.     Review of patient's allergies indicates:   Allergen Reactions    Amoxicillin Rash     Past Medical History:   Diagnosis Date    Arthritis     Hyperlipidemia     Hypertension     Leukemia     Pulmonary HTN     Sleep apnea 2011    pt usuesa sleep apnea machine     Past Surgical History:   Procedure Laterality Date    bladder lift      BREAST SURGERY      CHOLECYSTECTOMY      HYSTERECTOMY      leukemia      TONSILLECTOMY      TOTAL HIP ARTHROPLASTY       No family history on file.  Social History     Tobacco Use    Smoking status: Never    Smokeless tobacco: Never   Substance Use Topics    Alcohol use: No    Drug use: No     Review of Systems   Constitutional:  Negative for chills and fever.   HENT:  Negative for congestion, ear pain, nosebleeds, rhinorrhea, sore throat and trouble swallowing.    Eyes:  Negative for redness.   Respiratory:  Negative for cough, shortness of breath and stridor.    Cardiovascular:  Negative for chest pain.   Gastrointestinal:  Negative for abdominal pain, constipation, diarrhea, nausea and  vomiting.   Genitourinary:  Negative for decreased urine volume, dysuria, frequency, hematuria and urgency.   Musculoskeletal:  Positive for neck pain and neck stiffness. Negative for back pain.   Skin:  Negative for rash and wound.   Neurological:  Negative for dizziness, speech difficulty, weakness, light-headedness, numbness and headaches.   Psychiatric/Behavioral:  Negative for confusion.        Physical Exam     Initial Vitals [07/04/24 1513]   BP Pulse Resp Temp SpO2   (!) 131/100 74 20 98.5 °F (36.9 °C) 98 %      MAP       --         Physical Exam    Nursing note and vitals reviewed.  Constitutional: She appears well-developed and well-nourished. No distress.   HENT:   Head: Normocephalic.   Right Ear: External ear normal.   Left Ear: External ear normal.   Eyes: Conjunctivae are normal. Right eye exhibits no discharge. Left eye exhibits no discharge. No scleral icterus.   Neck: No tracheal deviation present.   Cardiovascular:  Intact distal pulses.           Pulmonary/Chest: No stridor. No respiratory distress.   Musculoskeletal:         General: Normal range of motion.      Comments: No midline cervical, thoracic or lumbar tenderness. L cervical paraspinal tenderness with spasms. Limited ROM of neck 2/2 pain with head rotation to the R side.      Neurological: She is alert.   Skin: Skin is warm and dry. No rash noted. No erythema.   Psychiatric: She has a normal mood and affect. Her behavior is normal. Judgment and thought content normal.         ED Course   Procedures  Labs Reviewed - No data to display       Imaging Results              X-Ray Cervical Spine AP And Lateral (Final result)  Result time 07/04/24 17:09:10      Final result by Sudarshan Garcia MD (07/04/24 17:09:10)                   Impression:      No acute displaced fracture-dislocation identified.  Cervical spondylosis.      Electronically signed by: Sudarshan Garcia MD  Date:    07/04/2024  Time:    17:09               Narrative:     EXAMINATION:  XR CERVICAL SPINE AP LATERAL    CLINICAL HISTORY:  Cervicalgia    TECHNIQUE:  AP, lateral and open mouth views of the cervical spine were performed.    COMPARISON:  Chest radiograph 12/11/2020    FINDINGS:  Study is just now submitted for interpretation secondary to technical difficulties with PACS.    Slight levocurvature with straightening of the cervical lordosis.  Vertebral body heights appear relatively maintained.  No displaced fracture, dislocation or significant listhesis.  Osseous process.  Moderate to advanced degenerative change at the atlantodental interval.  Dens and lateral masses appear grossly intact.  Multilevel degenerative disc disease with uncovertebral and facet arthrosis and marginal osteophytes most prominent at C4-5 through C6-7 levels.  No significant prevertebral soft tissue thickening.  No paraspinal mass or fluid collection.  No subcutaneous emphysema.  Scattered surgical clips about the left neck noted.  No apical pneumothorax.                                       Medications   ketorolac injection 15 mg (15 mg Intramuscular Given 7/4/24 1529)   morphine injection 4 mg (4 mg Intramuscular Given 7/4/24 1529)   predniSONE tablet 60 mg (60 mg Oral Given 7/4/24 7087)     Medical Decision Making  74-year-old female presenting for evaluation of atraumatic left-sided neck pain.  Reports associated spasm.  Denies any falls or trauma.  No radiation of pain.  No neurovascular deficits.  X-ray of the cervical spine independently interpreted with significant degenerative disc disease and straightening of the cervical lordosis as well as surgical clips..  Consistent with arthritic changes with spasm.  Radiology's interpretation with no findings of metastatic changes.  She has no focal neurologic deficits.  No meningeal signs.  No other associated symptoms to indicate anginal equivalent.  Morphine and Toradol given the ED with some improvement of symptoms.  Will have patient follow up  with pain/spine.  Will discharge with medications for symptomatic treatment of what is likely cervical spasm.  Will have her return ER for worsening or as needed.    Amount and/or Complexity of Data Reviewed  Radiology: ordered. Decision-making details documented in ED Course.    Risk  Prescription drug management.            Scribe Attestation:   Scribe #1: I performed the above scribed service and the documentation accurately describes the services I performed. I attest to the accuracy of the note.                             I, ALLEN Holly , personally performed the services described in this documentation. All medical record entries made by the scribe were at my direction and in my presence. I have reviewed the chart and agree that the record reflects my personal performance and is accurate and complete.      Clinical Impression:  Final diagnoses:  [M54.2] Neck pain  [M62.838] Cervical paraspinous muscle spasm (Primary)          ED Disposition Condition    Discharge Stable          ED Prescriptions       Medication Sig Dispense Start Date End Date Auth. Provider    predniSONE (DELTASONE) 20 MG tablet Take 3 tablets (60 mg total) by mouth once daily. for 4 days 12 tablet 7/5/2024 7/9/2024 Jeni Prajapati PA-C    LIDOcaine (LIDODERM) 5 % Place 1 patch onto the skin once daily. Remove & Discard patch within 12 hours or as directed by MD. May use 4% over the counter if not covered by insurance for 15 days 15 patch 7/4/2024 7/19/2024 Jeni Prajapati PA-C    methocarbamoL (ROBAXIN) 750 MG Tab Take 1 tablet (750 mg total) by mouth 3 (three) times daily as needed (for muscle spasms). 20 tablet 7/4/2024 7/11/2024 Jeni Prajapati PA-C          Follow-up Information       Follow up With Specialties Details Why Contact Info    [facility]   [address] [facility] [place] [address] [phone]    [facility]  Emergency Dept Emergency Medicine Go to  As needed, If symptoms worsen [address] [facility] [place] [address] [phone]             Jeni Prajapati PA-C  07/04/24 0003

## 2024-07-04 NOTE — DISCHARGE INSTRUCTIONS

## 2024-07-15 ENCOUNTER — TELEPHONE (OUTPATIENT)
Dept: GASTROENTEROLOGY | Facility: CLINIC | Age: 74
End: 2024-07-15
Payer: MEDICARE

## 2024-07-15 NOTE — TELEPHONE ENCOUNTER
----- Message from Gautam Woodard sent at 7/15/2024 11:50 AM CDT -----  Regarding: Appointment  Contact: [phone]  Calling to schedule appointment due to stomach problems possible ulcers. She also said her  is a longtime patient of the provider his name is Joey Laruen. She also would like to [place] [facility] [place] and be placed on the waiting list If possible. Please contact patient as soon as possible.

## 2024-07-16 ENCOUNTER — TELEPHONE (OUTPATIENT)
Dept: GASTROENTEROLOGY | Facility: CLINIC | Age: 74
End: 2024-07-16
Payer: MEDICARE

## 2024-07-16 NOTE — TELEPHONE ENCOUNTER
----- Message from Mattie Neil sent at 7/16/2024 11:25 AM CDT -----  Regarding: call back  Name of caller:mai       What is the requesting detail: pt states she is willing  to see a PA or NP . Please give her a call back to schedule an appt.       Can the clinic reply by [facility]:       What number to call back:[phone]

## 2024-07-24 ENCOUNTER — TELEPHONE (OUTPATIENT)
Dept: GASTROENTEROLOGY | Facility: CLINIC | Age: 74
End: 2024-07-24
Payer: MEDICARE

## 2024-07-24 NOTE — TELEPHONE ENCOUNTER
MA returned patients. [facility] for her to call the office at her convenience.

## 2024-07-24 NOTE — TELEPHONE ENCOUNTER
MA reached out to patient in regards to scheduling an appointment from a referral that was received. LVM for patient to call the office at her convenience.  Referral, Peptic ulcer

## 2024-07-24 NOTE — TELEPHONE ENCOUNTER
----- Message from Mahesh Khanna sent at 7/24/2024  2:30 PM CDT -----  Regarding: self  Type:  Patient Returning Call    Who Called:self    Who Left Message for Patient: Mima Valdez MA    Does the patient know what this is regarding?:no    Would the patient rather a call back or a response via [facility]?callback    Best Call Back Number:[phone]    Additional Information:

## 2024-11-09 ENCOUNTER — HOSPITAL ENCOUNTER (EMERGENCY)
Facility: HOSPITAL | Age: 74
Discharge: HOME | End: 2024-11-09
Attending: EMERGENCY MEDICINE
Payer: MEDICARE

## 2024-11-09 VITALS
WEIGHT: 215 LBS | BODY MASS INDEX: 36.7 KG/M2 | TEMPERATURE: 98 F | RESPIRATION RATE: 18 BRPM | DIASTOLIC BLOOD PRESSURE: 68 MMHG | OXYGEN SATURATION: 96 % | HEART RATE: 67 BPM | HEIGHT: 64 IN | SYSTOLIC BLOOD PRESSURE: 149 MMHG

## 2024-11-09 DIAGNOSIS — M79.641 RIGHT HAND PAIN: ICD-10-CM

## 2024-11-09 LAB
ALBUMIN SERPL BCP-MCNC: 3.2 G/DL (ref 3.5–5.2)
ALP SERPL-CCNC: 123 U/L (ref 40–150)
ALT SERPL W/O P-5'-P-CCNC: 16 U/L (ref 10–44)
ANION GAP SERPL CALC-SCNC: 11 MMOL/L (ref 8–16)
AST SERPL-CCNC: 15 U/L (ref 10–40)
BASOPHILS # BLD AUTO: 0.06 K/UL (ref 0–0.2)
BASOPHILS NFR BLD: 0.7 % (ref 0–1.9)
BILIRUB SERPL-MCNC: 0.3 MG/DL (ref 0.1–1)
BUN SERPL-MCNC: 16 MG/DL (ref 8–23)
CALCIUM SERPL-MCNC: 9.4 MG/DL (ref 8.7–10.5)
CHLORIDE SERPL-SCNC: 110 MMOL/L (ref 95–110)
CO2 SERPL-SCNC: 24 MMOL/L (ref 23–29)
CREAT SERPL-MCNC: 1 MG/DL (ref 0.5–1.4)
DIFFERENTIAL METHOD BLD: ABNORMAL
EOSINOPHIL # BLD AUTO: 0.1 K/UL (ref 0–0.5)
EOSINOPHIL NFR BLD: 1.2 % (ref 0–8)
ERYTHROCYTE [DISTWIDTH] IN BLOOD BY AUTOMATED COUNT: 13.8 % (ref 11.5–14.5)
EST. GFR  (NO RACE VARIABLE): 59 ML/MIN/1.73 M^2
GLUCOSE SERPL-MCNC: 109 MG/DL (ref 70–110)
HCT VFR BLD AUTO: 39.2 % (ref 37–48.5)
HGB BLD-MCNC: 12.5 G/DL (ref 12–16)
IMM GRANULOCYTES # BLD AUTO: 0.02 K/UL (ref 0–0.04)
IMM GRANULOCYTES NFR BLD AUTO: 0.2 % (ref 0–0.5)
LYMPHOCYTES # BLD AUTO: 0.9 K/UL (ref 1–4.8)
LYMPHOCYTES NFR BLD: 10.9 % (ref 18–48)
MCH RBC QN AUTO: 29.1 PG (ref 27–31)
MCHC RBC AUTO-ENTMCNC: 31.9 G/DL (ref 32–36)
MCV RBC AUTO: 91 FL (ref 82–98)
MONOCYTES # BLD AUTO: 0.7 K/UL (ref 0.3–1)
MONOCYTES NFR BLD: 7.6 % (ref 4–15)
NEUTROPHILS # BLD AUTO: 6.8 K/UL (ref 1.8–7.7)
NEUTROPHILS NFR BLD: 79.4 % (ref 38–73)
NRBC BLD-RTO: 0 /100 WBC
PLATELET # BLD AUTO: 216 K/UL (ref 150–450)
PMV BLD AUTO: 9.3 FL (ref 9.2–12.9)
POTASSIUM SERPL-SCNC: 4 MMOL/L (ref 3.5–5.1)
PROT SERPL-MCNC: 7 G/DL (ref 6–8.4)
RBC # BLD AUTO: 4.3 M/UL (ref 4–5.4)
SODIUM SERPL-SCNC: 145 MMOL/L (ref 136–145)
URATE SERPL-MCNC: 5.6 MG/DL (ref 2.4–5.7)
WBC # BLD AUTO: 8.6 K/UL (ref 3.9–12.7)

## 2024-11-09 PROCEDURE — 85025 COMPLETE CBC W/AUTO DIFF WBC: CPT

## 2024-11-09 PROCEDURE — 96374 THER/PROPH/DIAG INJ IV PUSH: CPT

## 2024-11-09 PROCEDURE — 96375 TX/PRO/DX INJ NEW DRUG ADDON: CPT

## 2024-11-09 PROCEDURE — 84550 ASSAY OF BLOOD/URIC ACID: CPT

## 2024-11-09 PROCEDURE — 80053 COMPREHEN METABOLIC PANEL: CPT

## 2024-11-09 PROCEDURE — 99284 EMERGENCY DEPT VISIT MOD MDM: CPT | Mod: 25

## 2024-11-09 PROCEDURE — 63600175 PHARM REV CODE 636 W HCPCS

## 2024-11-09 RX ORDER — NAPROXEN 500 MG/1
500 TABLET ORAL 2 TIMES DAILY
Qty: 10 TABLET | Refills: 0 | Status: SHIPPED | OUTPATIENT
Start: 2024-11-09 | End: 2024-11-14

## 2024-11-09 RX ORDER — KETOROLAC TROMETHAMINE 30 MG/ML
15 INJECTION, SOLUTION INTRAMUSCULAR; INTRAVENOUS
Status: COMPLETED | OUTPATIENT
Start: 2024-11-09 | End: 2024-11-09

## 2024-11-09 RX ORDER — DEXAMETHASONE SODIUM PHOSPHATE 4 MG/ML
8 INJECTION, SOLUTION INTRA-ARTICULAR; INTRALESIONAL; INTRAMUSCULAR; INTRAVENOUS; SOFT TISSUE
Status: COMPLETED | OUTPATIENT
Start: 2024-11-09 | End: 2024-11-09

## 2024-11-09 RX ADMIN — KETOROLAC TROMETHAMINE 15 MG: 30 INJECTION, SOLUTION INTRAMUSCULAR; INTRAVENOUS at 01:11

## 2024-11-09 RX ADMIN — DEXAMETHASONE SODIUM PHOSPHATE 8 MG: 4 INJECTION INTRA-ARTICULAR; INTRALESIONAL; INTRAMUSCULAR; INTRAVENOUS; SOFT TISSUE at 01:11

## 2024-11-09 NOTE — ED PROVIDER NOTES
Encounter Date: 11/9/2024    SCRIBE #1 NOTE: IDelia am scribing for, and in the presence of,  Fausto Powers PA-C. I have scribed the following portions of the note - Other sections scribed: HPI, ROS, PE.       History     Chief Complaint   Patient presents with    Hand Pain     Pt reports to ED for hand and wrist pain for 3 days. Denies injury or trauma. Swelling and redness noted to extremity.      Patient is a 74 y.o. female with a past medical history of arthritis, hypertension, pulmonary hypertension, hyperlipidemia, leukemia in remission who presents to the Emergency Department for evaluation of atraumatic non-radiating right hand and wrist pain x 3 days. She states she has carpal tunnel syndrome in both hands. She states she is currently in therapy for her neck and shoulders.  She reports possible gout in the past in her feet.  She has not taken any medications for the symptoms. She denies use of blood thinners. She denies fever, nausea, or vomiting.  Denies numbness.    The history is provided by the patient. No  was used.     Review of patient's allergies indicates:   Allergen Reactions    Amoxicillin Rash     Past Medical History:   Diagnosis Date    Arthritis     Hyperlipidemia     Hypertension     Leukemia     Pulmonary HTN     Sleep apnea 2011    pt usuesa sleep apnea machine     Past Surgical History:   Procedure Laterality Date    bladder lift      BREAST SURGERY      CHOLECYSTECTOMY      HYSTERECTOMY      leukemia      TONSILLECTOMY      TOTAL HIP ARTHROPLASTY       No family history on file.  Social History     Tobacco Use    Smoking status: Never    Smokeless tobacco: Never   Substance Use Topics    Alcohol use: No    Drug use: No     Review of Systems   Constitutional:  Negative for chills and fever.   Respiratory:  Negative for shortness of breath.    Cardiovascular:  Negative for chest pain.   Gastrointestinal:  Negative for abdominal pain, nausea and vomiting.    Genitourinary:  Negative for decreased urine volume and difficulty urinating.   Musculoskeletal:  Positive for arthralgias (right hand/wrist) and joint swelling. Negative for neck pain and neck stiffness.   Skin:  Negative for color change.   Neurological:  Negative for dizziness, light-headedness, numbness and headaches.       Physical Exam     Initial Vitals [11/09/24 1055]   BP Pulse Resp Temp SpO2   (!) 149/68 67 18 97.9 °F (36.6 °C) 96 %      MAP       --         Physical Exam    Nursing note and vitals reviewed.  Constitutional: She appears well-developed and well-nourished.   HENT:   Head: Normocephalic and atraumatic.   Right Ear: External ear normal.   Left Ear: External ear normal.   Neck: Carotid bruit is not present.   Normal range of motion.  Cardiovascular:  Normal rate, regular rhythm, normal heart sounds and intact distal pulses.     Exam reveals no gallop and no friction rub.       No murmur heard.  Pulmonary/Chest: Breath sounds normal. No respiratory distress. She has no wheezes. She has no rhonchi. She has no rales.   Abdominal: Abdomen is soft. Bowel sounds are normal. She exhibits no distension. There is no abdominal tenderness. There is no rebound and no guarding.   Musculoskeletal:         General: Normal range of motion.      Cervical back: Normal range of motion.      Comments: Moderate swelling to dorsal aspect of right hand/wrist with redness though an ice pack was applied to skin prior to my examination. Diminished strength secondary to pain, limited ROM secondary to pain.  Neurovascularly intact.     Neurological: She is alert and oriented to person, place, and time. GCS score is 15. GCS eye subscore is 4. GCS verbal subscore is 5. GCS motor subscore is 6.   Psychiatric: She has a normal mood and affect.         ED Course   Procedures  Labs Reviewed   CBC W/ AUTO DIFFERENTIAL - Abnormal       Result Value    WBC 8.60      RBC 4.30      Hemoglobin 12.5      Hematocrit 39.2      MCV 91       MCH 29.1      MCHC 31.9 (*)     RDW 13.8      Platelets 216      MPV 9.3      Immature Granulocytes 0.2      Gran # (ANC) 6.8      Immature Grans (Abs) 0.02      Lymph # 0.9 (*)     Mono # 0.7      Eos # 0.1      Baso # 0.06      nRBC 0      Gran % 79.4 (*)     Lymph % 10.9 (*)     Mono % 7.6      Eosinophil % 1.2      Basophil % 0.7      Differential Method Automated     COMPREHENSIVE METABOLIC PANEL - Abnormal    Sodium 145      Potassium 4.0      Chloride 110      CO2 24      Glucose 109      BUN 16      Creatinine 1.0      Calcium 9.4      Total Protein 7.0      Albumin 3.2 (*)     Total Bilirubin 0.3      Alkaline Phosphatase 123      AST 15      ALT 16      eGFR 59 (*)     Anion Gap 11     URIC ACID    Uric Acid 5.6            Imaging Results              X-Ray Hand 3 view Right (Final result)  Result time 11/09/24 12:57:54      Final result by Job Marcelo MD (11/09/24 12:57:54)                   Impression:      1. No convincing acute displaced fracture or dislocation of the hand noting degenerative changes and edema.      Electronically signed by: Job Marcelo MD  Date:    11/09/2024  Time:    12:57               Narrative:    EXAMINATION:  XR HAND COMPLETE 3 VIEW RIGHT    CLINICAL HISTORY:  right hand pain;    TECHNIQUE:  PA, lateral, and oblique views of the right hand were performed.    COMPARISON:  None    FINDINGS:  Three views right hand.    There is osteopenia.  There are degenerative changes of the PIP and D IP joints.  There are degenerative changes of the 1st carpal metacarpal joint.  There is calcification in the region of the TFCC.  No convincing acute displaced fracture or dislocation of the hand.  No radiopaque foreign body.  There is edema about the hand.                                       X-Ray Wrist Complete Right (Final result)  Result time 11/09/24 12:58:31      Final result by Job Marcelo MD (11/09/24 12:58:31)                   Impression:      1. No convincing  acute displaced fracture or dislocation of the wrist noting edema and degenerative changes.      Electronically signed by: Job Marcelo MD  Date:    11/09/2024  Time:    12:58               Narrative:    EXAMINATION:  XR WRIST COMPLETE 3 VIEWS RIGHT    CLINICAL HISTORY:  Pain in right hand    TECHNIQUE:  PA, lateral, and oblique views of the right wrist were performed.    COMPARISON:  None    FINDINGS:  Three views right wrist.    There are degenerative changes of the wrist.  There is calcification in the region of the TFCC.  There is osteopenia.  Prominent degenerative change noted of the 1st carpal metacarpal joint.  There is edema about the dorsal aspect of the hand and wrist.                                       Medications   ketorolac injection 15 mg (15 mg Intravenous Given 11/9/24 1308)   dexAMETHasone injection 8 mg (8 mg Intravenous Given 11/9/24 1308)     Medical Decision Making  This is an emergent evaluation of a 74 y.o. female with a past medical history of arthritis, hypertension, pulmonary hypertension, hyperlipidemia, leukemia in remission who presents to the Emergency Department for evaluation of atraumatic non-radiating right hand and wrist pain x 3 days.    Patient looks well clinically. Moderate swelling to dorsal aspect of right hand/wrist with redness though an ice pack was applied to skin prior to my examination. Diminished strength secondary to pain, limited ROM secondary to pain.  Neurovascularly intact. Regular rate rhythm without murmurs.  No carotid bruits appreciated on exam. Lungs are clear to auscultation bilaterally.  Abdomen is soft, nontender, non distended, with normal bowel sounds.     Differential diagnosis includes but is not limited to carpal tunnel syndrome, gout, fracture, dislocation, sprain.  Considered septic joint but highly doubtful given no joint erythema, swelling, mild range of motion, and no systemic symptoms.     Workup initiated with basic labs, uric acid,  x-ray.  Vital signs, chart, labs, and/or imaging were all reviewed.  See ED course below and interpretations above. My overall impression is osteoarthritis versus carpal tunnel. Will discharge home with short course of naproxen, outpatient follow-up. Patient is very well appearing, and in no acute distress. Vital signs are reassuring here in the emergency department, patient is afebrile, breathing comfortable, satting 96 % on room air. Patient/Caregiver is stable for discharge at this time.  Patient/Caregiver was informed of results and plan of care. Patient/Caregiver verbalized understanding of care plan. All questions and concerns were addressed. Discussed strict return precautions with the patient/caregiver. Instructed follow up with primary care provider within 1 week.      Fausto Powers PA-C    DISCLAIMER: This note was prepared with [employer] voice recognition transcription software. Garbled syntax, mangled pronouns, and other bizarre constructions may be attributed to that software system.       Amount and/or Complexity of Data Reviewed  Labs: ordered. Decision-making details documented in ED Course.  Radiology: ordered. Decision-making details documented in ED Course.    Risk  Prescription drug management.            Scribe Attestation:   Scribe #1: I performed the above scribed service and the documentation accurately describes the services I performed. I attest to the accuracy of the note.        ED Course as of 11/09/24 1324   Sat Nov 09, 2024   1106 BP(!): 149/68 [TM]   1106 Temp: 97.9 °F (36.6 °C) [TM]   1106 Pulse: 67 [TM]   1106 Resp: 18 [TM]   1106 SpO2: 96 % [TM]   1223 CBC auto differential(!)  CBC is without leukocytosis or anemia.  Normal platelets. [TM]   1234 Comprehensive metabolic panel(!)  CMP with normal BUN/creatinine, GFR 59.  No other abnormalities. [TM]   1234 Uric acid  Within normal limits. [TM]   1257 Ordered Toradol, Decadron. [TM]   1304 X-Ray Hand 3 view Right  1. No convincing  acute displaced fracture or dislocation of the hand noting degenerative changes and edema. [TM]   1304 X-Ray Wrist Complete Right  No convincing acute displaced fracture or dislocation of the wrist noting edema and degenerative changes.  [TM]   1322 Informed patient of results.  Suspect symptoms related to carpal tunnel versus osteoarthritis.  She reports having a brace at that she can use as well as an orthopedist that she follows up with regularly.  Will discharge home on very short course of naproxen and follow-up.  All questions and concerns were addressed.  Patient comfortable with this plan. [TM]      ED Course User Index  [TM] Fausto Powers PA-C                           Clinical Impression:  Final diagnoses:  [M79.641] Right hand pain          ED Disposition Condition    Discharge Stable          I, Fausto Powers PA-C, personally performed the services described in this documentation. All medical record entries made by the scribe were at my direction and in my presence. I have reviewed the chart and agree that the record reflects my personal performance and is accurate and complete.      DISCLAIMER: This note was prepared with [employer] voice recognition transcription software. Garbled syntax, mangled pronouns, and other bizarre constructions may be attributed to that software system.  ED Prescriptions       Medication Sig Dispense Start Date End Date Auth. Provider    naproxen (NAPROSYN) 500 MG tablet Take 1 tablet (500 mg total) by mouth 2 (two) times daily. for 5 days 10 tablet 11/9/2024 11/14/2024 Fausto Powers PA-C          Follow-up Information       Follow up With Specialties Details Why Contact Info    [facility] - Emergency Dept Emergency Medicine Go to  As needed, If symptoms worsen, or new symptoms develop [address] [facility] [place] [address] [phone]    Primary care doctor  Schedule an appointment as soon as possible for a visit in  3 days               Fausto Powers PA-C  11/09/24 5108

## 2024-11-09 NOTE — DISCHARGE INSTRUCTIONS
You were seen in the emergency department today for hand pain.  Please take all medications as prescribed and as we discussed.  Follow-up with specialist if instructed to do so.  It is important to remember that some problems are difficult to diagnose and may not be found during your Emergency Department visit. Be sure to follow up with your primary care doctor and review all labs/imaging/tests that were performed during this visit with them. Some labs/tests may be outside of the normal range and require non-emergent follow-up and further investigation to help diagnose/exclude/prevent complications or other medical conditions. Return to the emergency department for any new or worsening symptoms. Thank you for allowing me to care for you today, it was my pleasure. I hope you get to feeling better soon!

## 2025-01-10 ENCOUNTER — HOSPITAL ENCOUNTER (OUTPATIENT)
Facility: HOSPITAL | Age: 75
Discharge: HOME | End: 2025-01-11
Attending: STUDENT IN AN ORGANIZED HEALTH CARE EDUCATION/TRAINING PROGRAM | Admitting: STUDENT IN AN ORGANIZED HEALTH CARE EDUCATION/TRAINING PROGRAM
Payer: MEDICARE

## 2025-01-10 DIAGNOSIS — I21.4 NON-ST ELEVATION MYOCARDIAL INFARCTION (NSTEMI): ICD-10-CM

## 2025-01-10 DIAGNOSIS — R07.89 OTHER CHEST PAIN: Primary | ICD-10-CM

## 2025-01-10 DIAGNOSIS — R07.9 CHEST PAIN: ICD-10-CM

## 2025-01-10 DIAGNOSIS — I21.4 NSTEMI (NON-ST ELEVATION MYOCARDIAL INFARCTION): ICD-10-CM

## 2025-01-10 DIAGNOSIS — M25.512 LEFT SHOULDER PAIN: ICD-10-CM

## 2025-01-10 LAB
ABO + RH BLD: NORMAL
ALBUMIN SERPL BCP-MCNC: 3.2 G/DL (ref 3.5–5.2)
ALP SERPL-CCNC: 126 U/L (ref 40–150)
ALT SERPL W/O P-5'-P-CCNC: 16 U/L (ref 10–44)
ANION GAP SERPL CALC-SCNC: 8 MMOL/L (ref 8–16)
APTT PPP: 24.2 SEC (ref 21–32)
AST SERPL-CCNC: 15 U/L (ref 10–40)
BASOPHILS # BLD AUTO: 0.04 K/UL (ref 0–0.2)
BASOPHILS NFR BLD: 0.7 % (ref 0–1.9)
BILIRUB SERPL-MCNC: 0.4 MG/DL (ref 0.1–1)
BILIRUB UR QL STRIP: NEGATIVE
BLD GP AB SCN CELLS X3 SERPL QL: NORMAL
BNP SERPL-MCNC: 99 PG/ML (ref 0–99)
BUN SERPL-MCNC: 16 MG/DL (ref 8–23)
CALCIUM SERPL-MCNC: 9.7 MG/DL (ref 8.7–10.5)
CHLORIDE SERPL-SCNC: 112 MMOL/L (ref 95–110)
CHOLEST SERPL-MCNC: 133 MG/DL (ref 120–199)
CHOLEST/HDLC SERPL: 2.9 {RATIO} (ref 2–5)
CLARITY UR: CLEAR
CO2 SERPL-SCNC: 23 MMOL/L (ref 23–29)
COLOR UR: YELLOW
CREAT SERPL-MCNC: 0.8 MG/DL (ref 0.5–1.4)
D DIMER PPP IA.FEU-MCNC: 0.92 MG/L FEU
DIFFERENTIAL METHOD BLD: ABNORMAL
EOSINOPHIL # BLD AUTO: 0.1 K/UL (ref 0–0.5)
EOSINOPHIL NFR BLD: 2.2 % (ref 0–8)
ERYTHROCYTE [DISTWIDTH] IN BLOOD BY AUTOMATED COUNT: 14.4 % (ref 11.5–14.5)
EST. GFR  (NO RACE VARIABLE): >60 ML/MIN/1.73 M^2
GLUCOSE SERPL-MCNC: 103 MG/DL (ref 70–110)
GLUCOSE UR QL STRIP: NEGATIVE
HCT VFR BLD AUTO: 36.1 % (ref 37–48.5)
HDLC SERPL-MCNC: 46 MG/DL (ref 40–75)
HDLC SERPL: 34.6 % (ref 20–50)
HGB BLD-MCNC: 11.1 G/DL (ref 12–16)
HGB UR QL STRIP: NEGATIVE
IMM GRANULOCYTES # BLD AUTO: 0.04 K/UL (ref 0–0.04)
IMM GRANULOCYTES NFR BLD AUTO: 0.7 % (ref 0–0.5)
INR PPP: 0.9 (ref 0.8–1.2)
KETONES UR QL STRIP: NEGATIVE
LDLC SERPL CALC-MCNC: 66.8 MG/DL (ref 63–159)
LEUKOCYTE ESTERASE UR QL STRIP: NEGATIVE
LYMPHOCYTES # BLD AUTO: 1 K/UL (ref 1–4.8)
LYMPHOCYTES NFR BLD: 17.7 % (ref 18–48)
MCH RBC QN AUTO: 26.9 PG (ref 27–31)
MCHC RBC AUTO-ENTMCNC: 30.7 G/DL (ref 32–36)
MCV RBC AUTO: 88 FL (ref 82–98)
MONOCYTES # BLD AUTO: 0.3 K/UL (ref 0.3–1)
MONOCYTES NFR BLD: 5.8 % (ref 4–15)
NEUTROPHILS # BLD AUTO: 4.3 K/UL (ref 1.8–7.7)
NEUTROPHILS NFR BLD: 72.9 % (ref 38–73)
NITRITE UR QL STRIP: NEGATIVE
NONHDLC SERPL-MCNC: 87 MG/DL
NRBC BLD-RTO: 0 /100 WBC
PH UR STRIP: 6 [PH] (ref 5–8)
PLATELET # BLD AUTO: 238 K/UL (ref 150–450)
PMV BLD AUTO: 9.2 FL (ref 9.2–12.9)
POTASSIUM SERPL-SCNC: 3.8 MMOL/L (ref 3.5–5.1)
PROT SERPL-MCNC: 6.8 G/DL (ref 6–8.4)
PROT UR QL STRIP: ABNORMAL
PROTHROMBIN TIME: 10.6 SEC (ref 9–12.5)
RBC # BLD AUTO: 4.12 M/UL (ref 4–5.4)
SODIUM SERPL-SCNC: 143 MMOL/L (ref 136–145)
SP GR UR STRIP: >1.03 (ref 1–1.03)
SPECIMEN OUTDATE: NORMAL
TRIGL SERPL-MCNC: 101 MG/DL (ref 30–150)
TROPONIN I SERPL DL<=0.01 NG/ML-MCNC: 0.01 NG/ML (ref 0–0.03)
TROPONIN I SERPL DL<=0.01 NG/ML-MCNC: <0.006 NG/ML (ref 0–0.03)
TROPONIN I SERPL DL<=0.01 NG/ML-MCNC: <0.006 NG/ML (ref 0–0.03)
URN SPEC COLLECT METH UR: ABNORMAL
UROBILINOGEN UR STRIP-ACNC: NEGATIVE EU/DL
WBC # BLD AUTO: 5.83 K/UL (ref 3.9–12.7)

## 2025-01-10 PROCEDURE — 93010 ELECTROCARDIOGRAM REPORT: CPT | Mod: ,,, | Performed by: INTERNAL MEDICINE

## 2025-01-10 PROCEDURE — 80053 COMPREHEN METABOLIC PANEL: CPT | Performed by: NURSE PRACTITIONER

## 2025-01-10 PROCEDURE — 83880 ASSAY OF NATRIURETIC PEPTIDE: CPT | Performed by: NURSE PRACTITIONER

## 2025-01-10 PROCEDURE — 81003 URINALYSIS AUTO W/O SCOPE: CPT | Performed by: NURSE PRACTITIONER

## 2025-01-10 PROCEDURE — 25500020 PHARM REV CODE 255: Performed by: STUDENT IN AN ORGANIZED HEALTH CARE EDUCATION/TRAINING PROGRAM

## 2025-01-10 PROCEDURE — 25000003 PHARM REV CODE 250

## 2025-01-10 PROCEDURE — 63600175 PHARM REV CODE 636 W HCPCS

## 2025-01-10 PROCEDURE — 83036 HEMOGLOBIN GLYCOSYLATED A1C: CPT

## 2025-01-10 PROCEDURE — 63600175 PHARM REV CODE 636 W HCPCS: Mod: JZ,TB

## 2025-01-10 PROCEDURE — 96374 THER/PROPH/DIAG INJ IV PUSH: CPT

## 2025-01-10 PROCEDURE — 85025 COMPLETE CBC W/AUTO DIFF WBC: CPT | Performed by: NURSE PRACTITIONER

## 2025-01-10 PROCEDURE — 85610 PROTHROMBIN TIME: CPT

## 2025-01-10 PROCEDURE — G0378 HOSPITAL OBSERVATION PER HR: HCPCS

## 2025-01-10 PROCEDURE — 80061 LIPID PANEL: CPT

## 2025-01-10 PROCEDURE — 94761 N-INVAS EAR/PLS OXIMETRY MLT: CPT

## 2025-01-10 PROCEDURE — 84484 ASSAY OF TROPONIN QUANT: CPT | Mod: 91 | Performed by: NURSE PRACTITIONER

## 2025-01-10 PROCEDURE — 85379 FIBRIN DEGRADATION QUANT: CPT

## 2025-01-10 PROCEDURE — 96375 TX/PRO/DX INJ NEW DRUG ADDON: CPT

## 2025-01-10 PROCEDURE — 86900 BLOOD TYPING SEROLOGIC ABO: CPT

## 2025-01-10 PROCEDURE — 85730 THROMBOPLASTIN TIME PARTIAL: CPT

## 2025-01-10 PROCEDURE — 99285 EMERGENCY DEPT VISIT HI MDM: CPT | Mod: 25

## 2025-01-10 PROCEDURE — 93005 ELECTROCARDIOGRAM TRACING: CPT

## 2025-01-10 PROCEDURE — 96372 THER/PROPH/DIAG INJ SC/IM: CPT

## 2025-01-10 PROCEDURE — 84484 ASSAY OF TROPONIN QUANT: CPT | Mod: 91

## 2025-01-10 RX ORDER — ISOSORBIDE DINITRATE 10 MG/1
10 TABLET ORAL 3 TIMES DAILY
Status: DISCONTINUED | OUTPATIENT
Start: 2025-01-10 | End: 2025-01-11 | Stop reason: HOSPADM

## 2025-01-10 RX ORDER — NAPROXEN SODIUM 220 MG/1
324 TABLET, FILM COATED ORAL ONCE
Status: COMPLETED | OUTPATIENT
Start: 2025-01-10 | End: 2025-01-10

## 2025-01-10 RX ORDER — ASPIRIN 81 MG/1
81 TABLET ORAL DAILY
Status: DISCONTINUED | OUTPATIENT
Start: 2025-01-11 | End: 2025-01-11 | Stop reason: HOSPADM

## 2025-01-10 RX ORDER — ONDANSETRON HYDROCHLORIDE 2 MG/ML
4 INJECTION, SOLUTION INTRAVENOUS
Status: ACTIVE | OUTPATIENT
Start: 2025-01-10 | End: 2025-01-11

## 2025-01-10 RX ORDER — ATORVASTATIN CALCIUM 80 MG/1
80 TABLET, FILM COATED ORAL DAILY
COMMUNITY

## 2025-01-10 RX ORDER — ISOSORBIDE DINITRATE 10 MG/1
10 TABLET ORAL 3 TIMES DAILY
COMMUNITY

## 2025-01-10 RX ORDER — HYDRALAZINE HYDROCHLORIDE 25 MG/1
50 TABLET, FILM COATED ORAL 2 TIMES DAILY
Status: DISCONTINUED | OUTPATIENT
Start: 2025-01-10 | End: 2025-01-11 | Stop reason: HOSPADM

## 2025-01-10 RX ORDER — HYDROMORPHONE HYDROCHLORIDE 1 MG/ML
0.5 INJECTION, SOLUTION INTRAMUSCULAR; INTRAVENOUS; SUBCUTANEOUS
Status: COMPLETED | OUTPATIENT
Start: 2025-01-10 | End: 2025-01-10

## 2025-01-10 RX ORDER — SERTRALINE HYDROCHLORIDE 50 MG/1
200 TABLET, FILM COATED ORAL NIGHTLY
Status: DISCONTINUED | OUTPATIENT
Start: 2025-01-10 | End: 2025-01-11 | Stop reason: HOSPADM

## 2025-01-10 RX ORDER — LIDOCAINE 50 MG/G
1 PATCH TOPICAL
Status: DISCONTINUED | OUTPATIENT
Start: 2025-01-10 | End: 2025-01-11 | Stop reason: HOSPADM

## 2025-01-10 RX ORDER — HYDRALAZINE HYDROCHLORIDE 50 MG/1
50 TABLET, FILM COATED ORAL 2 TIMES DAILY
COMMUNITY

## 2025-01-10 RX ORDER — NITROGLYCERIN 0.4 MG/1
0.4 TABLET SUBLINGUAL EVERY 5 MIN PRN
Status: DISCONTINUED | OUTPATIENT
Start: 2025-01-10 | End: 2025-01-11 | Stop reason: HOSPADM

## 2025-01-10 RX ORDER — ZINC GLUCONATE 50 MG
1 TABLET ORAL DAILY
COMMUNITY

## 2025-01-10 RX ORDER — KETOROLAC TROMETHAMINE 30 MG/ML
15 INJECTION, SOLUTION INTRAMUSCULAR; INTRAVENOUS
Status: COMPLETED | OUTPATIENT
Start: 2025-01-10 | End: 2025-01-10

## 2025-01-10 RX ORDER — CALCIUM CARBONATE 200(500)MG
1000 TABLET,CHEWABLE ORAL 2 TIMES DAILY
Status: DISCONTINUED | OUTPATIENT
Start: 2025-01-10 | End: 2025-01-11 | Stop reason: HOSPADM

## 2025-01-10 RX ORDER — ATORVASTATIN CALCIUM 40 MG/1
80 TABLET, FILM COATED ORAL DAILY
Status: DISCONTINUED | OUTPATIENT
Start: 2025-01-11 | End: 2025-01-11 | Stop reason: HOSPADM

## 2025-01-10 RX ORDER — ENOXAPARIN SODIUM 100 MG/ML
40 INJECTION SUBCUTANEOUS EVERY 24 HOURS
Status: DISCONTINUED | OUTPATIENT
Start: 2025-01-10 | End: 2025-01-11 | Stop reason: HOSPADM

## 2025-01-10 RX ORDER — SERTRALINE HYDROCHLORIDE 100 MG/1
200 TABLET, FILM COATED ORAL NIGHTLY
COMMUNITY

## 2025-01-10 RX ORDER — LOSARTAN POTASSIUM 25 MG/1
100 TABLET ORAL DAILY
Status: DISCONTINUED | OUTPATIENT
Start: 2025-01-11 | End: 2025-01-11 | Stop reason: HOSPADM

## 2025-01-10 RX ORDER — AMLODIPINE BESYLATE 5 MG/1
10 TABLET ORAL DAILY
Status: DISCONTINUED | OUTPATIENT
Start: 2025-01-11 | End: 2025-01-11 | Stop reason: HOSPADM

## 2025-01-10 RX ORDER — NAPROXEN SODIUM 220 MG/1
81 TABLET, FILM COATED ORAL DAILY
Status: DISCONTINUED | OUTPATIENT
Start: 2025-01-11 | End: 2025-01-10

## 2025-01-10 RX ADMIN — HYDRALAZINE HYDROCHLORIDE 50 MG: 25 TABLET ORAL at 09:01

## 2025-01-10 RX ADMIN — CALCIUM CARBONATE (ANTACID) CHEW TAB 500 MG 1000 MG: 500 CHEW TAB at 09:01

## 2025-01-10 RX ADMIN — ENOXAPARIN SODIUM 40 MG: 40 INJECTION SUBCUTANEOUS at 09:01

## 2025-01-10 RX ADMIN — ASPIRIN 81 MG CHEWABLE TABLET 324 MG: 81 TABLET CHEWABLE at 09:01

## 2025-01-10 RX ADMIN — SERTRALINE HYDROCHLORIDE 200 MG: 50 TABLET ORAL at 10:01

## 2025-01-10 RX ADMIN — LIDOCAINE 5% 1 PATCH: 700 PATCH TOPICAL at 04:01

## 2025-01-10 RX ADMIN — IOHEXOL 100 ML: 350 INJECTION, SOLUTION INTRAVENOUS at 04:01

## 2025-01-10 RX ADMIN — KETOROLAC TROMETHAMINE 15 MG: 30 INJECTION, SOLUTION INTRAMUSCULAR; INTRAVENOUS at 03:01

## 2025-01-10 RX ADMIN — HYDROMORPHONE HYDROCHLORIDE 0.5 MG: 1 INJECTION, SOLUTION INTRAMUSCULAR; INTRAVENOUS; SUBCUTANEOUS at 03:01

## 2025-01-10 RX ADMIN — ISOSORBIDE DINITRATE 10 MG: 10 TABLET ORAL at 10:01

## 2025-01-10 NOTE — PHARMACY MED REC
"Admission Medication History     The home medication history was taken by Jenni Grey.    You may go to "Admission" then "Reconcile Home Medications" tabs to review and/or act upon these items.     The home medication list has been updated by the Pharmacy department.   Please read ALL comments highlighted in yellow.   Please address this information as you see fit.    Feel free to contact us if you have any questions or require assistance.        Medications listed below were obtained from: [employer] software- [employer]  (Not in a hospital admission)          Jenni Grey  [phone]                 .          "

## 2025-01-10 NOTE — ED NOTES
Patient presents to ED due to CP which radiates to Left shoulder which began last night, pt describes pain as a sharp pain, cardiac workup in progress.

## 2025-01-10 NOTE — FIRST PROVIDER EVALUATION
"Medical screening examination initiated.  I have conducted a focused provider triage encounter, findings are as follows:    Brief history of present illness:  CP and left shoulder that started yesterday    Vitals:    01/10/25 1357   BP: (!) 162/89   BP Location: Left arm   Pulse: 75   Resp: 20   Temp: 98.7 °F (37.1 °C)   TempSrc: Oral   SpO2: 98%   Weight: 97.5 kg (215 lb)   Height: 5' 4" (1.626 m)       Pertinent physical exam:  NAD    Brief workup plan:  labs, EKG, CXR, UA    Preliminary workup initiated; this workup will be continued and followed by the physician or advanced practice provider that is assigned to the patient when roomed.  "

## 2025-01-10 NOTE — ED PROVIDER NOTES
Encounter Date: 1/10/2025       History     Chief Complaint   Patient presents with    Chest Pain    Shoulder Pain     Pt arrived in ED, c/o non traumatic left shoulder and CP since yesterday afternoon. Pt denies any SOB, abd pain or n/v/d     74-year-old female with a past medical history of arthritis, hypertension, pulmonary hypertension, hyperlipidemia, leukemia in remission now presents with a chief complaint of chest and shoulder pain.  Patient reports that this morning she woke up and had severe left shoulder pain associated with neck pain and chest wall pain on her left side.  He reports that she has arthritis and has had similar pains over right shoulder but it is now affecting her left shoulder.  Patient reports that taking a deep breath causes crampy pain of her left chest wall.  Patient denies any numbness or weakness of her affected extremity but endorses pain.  Patient addition tells me that her legs were bilaterally swollen yesterday but improved since.  Patient denies any headaches, vision changes, numbness/weakness, abdominal pain, nausea, or vomiting.    The history is provided by the patient.     Review of patient's allergies indicates:   Allergen Reactions    Amoxicillin Rash     Past Medical History:   Diagnosis Date    Arthritis     Hyperlipidemia     Hypertension     Leukemia     Pulmonary HTN     Sleep apnea 2011    pt usuesa sleep apnea machine     Past Surgical History:   Procedure Laterality Date    bladder lift      BREAST SURGERY      CHOLECYSTECTOMY      HYSTERECTOMY      leukemia      TONSILLECTOMY      TOTAL HIP ARTHROPLASTY       No family history on file.  Social History     Tobacco Use    Smoking status: Never    Smokeless tobacco: Never   Substance Use Topics    Alcohol use: No    Drug use: No     Review of Systems    Physical Exam     Initial Vitals [01/10/25 1357]   BP Pulse Resp Temp SpO2   (!) 162/89 75 20 98.7 °F (37.1 °C) 98 %      MAP       --         Physical  Exam    Nursing note and vitals reviewed.      Gen:  In distress.  AxOx3, well nourished, appears stated age, no pallor, no jaundice, appears well hydrated  Eye: EOMI, no scleral icterus, no periorbital edema or ecchymosis  Head: Normocephalic, atraumatic, no lesions, scalp appears normal  ENT: Neck supple, no stridor, no masses, no drooling or voice changes  Tenderness to palpation over left side of neck/trapezius  CVS: All distal pulses intact with normal rate and rhythm, no JVD, normal S1/S2, no murmur  Tenderness to palpation over chest wall on left side  Pulm:  No increased work of breathing, no wheeze, bilateral scant basal crackles.  Abd:  Nondistended, soft, nontender, no organomegaly, no CVAT  Ext: LUE  - Skin intact throughout, no scars present  - No swelling or deformity  - No ecchymosis, erythema, or signs of cellulitis  - No tenderness to palpation of shoulder, elbow, wrist  Tenderness to palpation of shoulder elbow  - No tenderness to palpation of proximal/ middle/ distal ulna or radius  - No tenderness to palpation of anatomical snuffbox  - No tenderness to palpation of hand or fingers  - Radial pulse 2+ & CRT <2  - Sensation intact over median/radial/ulnar distribution  - Pain with any ROM at shoulder  - Full painless ROM of shoulder  - Full painless ROM at all MCP/ PIP/ IP/ DIP joints with preserved strength  Neuro: GCS15, moving all extremities, gait intact, face grossly symmetric  Psych: normal affect, cooperative, well groomed, makes good eye contact      ED Course   Procedures  Labs Reviewed   CBC W/ AUTO DIFFERENTIAL - Abnormal       Result Value    WBC 5.83      RBC 4.12      Hemoglobin 11.1 (*)     Hematocrit 36.1 (*)     MCV 88      MCH 26.9 (*)     MCHC 30.7 (*)     RDW 14.4      Platelets 238      MPV 9.2      Immature Granulocytes 0.7 (*)     Gran # (ANC) 4.3      Immature Grans (Abs) 0.04      Lymph # 1.0      Mono # 0.3      Eos # 0.1      Baso # 0.04      nRBC 0      Gran % 72.9       Lymph % 17.7 (*)     Mono % 5.8      Eosinophil % 2.2      Basophil % 0.7      Differential Method Automated     COMPREHENSIVE METABOLIC PANEL - Abnormal    Sodium 143      Potassium 3.8      Chloride 112 (*)     CO2 23      Glucose 103      BUN 16      Creatinine 0.8      Calcium 9.7      Total Protein 6.8      Albumin 3.2 (*)     Total Bilirubin 0.4      Alkaline Phosphatase 126      AST 15      ALT 16      eGFR >60      Anion Gap 8     D DIMER, QUANTITATIVE - Abnormal    D-Dimer 0.92 (*)    TROPONIN I    Troponin I <0.006     B-TYPE NATRIURETIC PEPTIDE    BNP 99     URINALYSIS, REFLEX TO URINE CULTURE   TROPONIN I     EKG Readings: (Independently Interpreted)   As per my independent interpretation normal sinus rhythm with rate 73 with right bundle-branch block with nonspecific ST changes without ST depression or elevation       Imaging Results              CTA Chest Non-Coronary (PE Studies) (Final result)  Result time 01/10/25 17:14:10      Final result by Callie Rodrigues MD (01/10/25 17:14:10)                   Impression:      1. No evidence of PE.  2. Mild bilateral lower lobe atelectatic changes and patchy ground-glass density.  No focal consolidation.      Electronically signed by: Callie Rodrigues MD  Date:    01/10/2025  Time:    17:14               Narrative:    EXAMINATION:  CTA CHEST NON CORONARY (PE STUDIES)    CLINICAL HISTORY:  Pulmonary embolism (PE) suspected, positive D-dimer;    TECHNIQUE:  Low dose axial images, sagittal and coronal reformations were obtained from the thoracic inlet to the lung bases following the IV administration of 75 mL of Omnipaque 350.  Contrast timing was optimized to evaluate the pulmonary arteries.  MIP images were performed.    COMPARISON:  None    FINDINGS:  Structures at the base of the neck are unremarkable.  Aorta is non-aneurysmal.  The heart is normal in size without pericardial effusion.  No intraluminal filling defects within the pulmonary arteries to suggest  pulmonary thromboembolism.   There is no evidence of mediastinal, axillary, or hilar lymph node enlargement.  The esophagus is unremarkable along its course.    The trachea and bronchi are patent.  The lungs are symmetrically expanded.  Mild atelectatic changes and patchy ground-glass density are seen in the lower lobes.  No evidence of focal consolidation.  No pleural effusion.  No evidence to suggest pulmonary hemorrhage or infarction.    The visualized abdominal structures show no acute abnormalities.  Gallbladder has been removed.  Osseous structures demonstrate degenerative change.  Extrathoracic soft tissues are unremarkable.                                       X-Ray Shoulder 2 or More Views Left (Final result)  Result time 01/10/25 17:06:49   Procedure changed from X-Ray Shoulder Trauma Left     Final result by Callie Rodrigues MD (01/10/25 17:06:49)                   Impression:      No acute osseous abnormality identified.      Electronically signed by: Callie Rodrigues MD  Date:    01/10/2025  Time:    17:06               Narrative:    EXAMINATION:  XR SHOULDER COMPLETE 2 OR MORE VIEWS LEFT    CLINICAL HISTORY:  left shoulder pain; Pain in left shoulder    TECHNIQUE:  Two views of the left shoulder were performed.    COMPARISON  None    FINDINGS:  No evidence of acute displaced fracture, dislocation, or osseous destructive process.  Mild glenohumeral and moderate acromioclavicular degenerative changes are seen.                                       X-Ray Chest PA And Lateral (Final result)  Result time 01/10/25 15:12:01      Final result by Ian Palma MD (01/10/25 15:12:01)                   Impression:      Noting limitations above, no convincing evidence of acute cardiopulmonary process.      Electronically signed by: Ian Palma  Date:    01/10/2025  Time:    15:12               Narrative:    EXAMINATION:  XR CHEST PA AND LATERAL    CLINICAL HISTORY:  Chest Pain;    TECHNIQUE:  PA and  lateral views of the chest were performed.    COMPARISON:  Chest radiograph performed 12/11/2020.    FINDINGS:  Comment: Essentially nondiagnostic lateral view.    Cardiac contours grossly unchanged.    Lungs essentially clear.    No definite pneumothorax or large volume pleural effusion.    No acute findings in the visualized abdomen.    No definite acute osseous or soft tissue findings.                                       Medications   LIDOcaine 5 % patch 1 patch (1 patch Transdermal Patch Applied 1/10/25 1621)   ondansetron injection 4 mg (4 mg Intravenous Not Given 1/10/25 1615)   nitroGLYCERIN SL tablet 0.4 mg (has no administration in time range)   HYDROmorphone injection 0.5 mg (0.5 mg Intravenous Given 1/10/25 1556)   ketorolac injection 15 mg (15 mg Intravenous Given 1/10/25 1553)   iohexoL (OMNIPAQUE 350) injection 100 mL (100 mLs Intravenous Given 1/10/25 1640)     Medical Decision Making  Initial assessment  74-year-old female with a past medical history of arthritis, hypertension, pulmonary hypertension, hyperlipidemia, leukemia in remission now presents with a chief complaint of chest and shoulder pain. Patient is able to vocalise, breathing spontaneously, hemodynamically stable, oriented, moving all 4 limbs spontaneously.  Examination consistent with diffuse tenderness of left arm left chest wall, left side of neck.      Differential diagnosis  Fibromyalgia  Muscle spasm  ACS  Considered aortic dissection but lower suspicion given history and physical examination as all pulses are equal and palpable  PE  Pneumonia  Electrolyte/metabolic derangement    ED management  On arrival patient was stable however having severe pain.  Workup was commenced for above-mentioned differential.  Initial EKG without signs concerning for ischemia and troponin negative.  Initial D-dimer positive despite being age adjusted and CTA PE was obtained which did not show any features concerning for PE on my interpretation.   Given patient's heart score she will require admission for ACS rule out.  Patient was discussed with Hospital Medicine and admitted to their service.    Amount and/or Complexity of Data Reviewed  Labs:  Decision-making details documented in ED Course.  Radiology: ordered.    Risk  Prescription drug management.      Additional MDM:   Heart Score:    History:          Moderately suspicious.  ECG:             Nonspecific repolarisation disturbance  Age:               45-65 years  Risk factors: >= 3 risk factors or history of atherosclerotic disease  Troponin:       Less than or equal to normal limit  Heart Score = 5                ED Course as of 01/10/25 1738   Fri Donovan 10, 2025   1627 BP(!): 162/89 [PM]   1627 MAP (mmHg): 119 [PM]   1627 Temp: 98.7 °F (37.1 °C) [PM]   1627 Pulse: 75 [PM]   1627 Resp: 20 [PM]   1627 SpO2: 98 % [PM]   1627 WBC: 5.83 [PM]   1627 Hemoglobin(!): 11.1 [PM]   1627 Platelet Count: 238 [PM]   1627 Sodium: 143 [PM]   1627 Potassium: 3.8 [PM]   1627 BUN: 16 [PM]   1627 Creatinine: 0.8 [PM]   1627 BNP: 99 [PM]   1627 D-Dimer(!): 0.92  Will obtain CTA PE [PM]   1627 Troponin I: <0.006 [PM]      ED Course User Index  [PM] Caden Morrow MD                           Clinical Impression:  Final diagnoses:  [R07.9] Chest pain  [M25.512] Left shoulder pain                   Caden Morrow MD  Resident  01/10/25 1738

## 2025-01-11 VITALS
WEIGHT: 221.13 LBS | OXYGEN SATURATION: 93 % | RESPIRATION RATE: 16 BRPM | DIASTOLIC BLOOD PRESSURE: 81 MMHG | BODY MASS INDEX: 37.75 KG/M2 | TEMPERATURE: 99 F | HEART RATE: 57 BPM | HEIGHT: 64 IN | SYSTOLIC BLOOD PRESSURE: 122 MMHG

## 2025-01-11 PROBLEM — R07.89 OTHER CHEST PAIN: Status: ACTIVE | Noted: 2025-01-11

## 2025-01-11 LAB
ASCENDING AORTA: 3.14 CM
AV INDEX (PROSTH): 0.58
AV MEAN GRADIENT: 10.4 MMHG
AV PEAK GRADIENT: 21.2 MMHG
AV VALVE AREA BY VELOCITY RATIO: 2.3 CM²
AV VALVE AREA: 2.2 CM²
AV VELOCITY RATIO: 0.61
BSA FOR ECHO PROCEDURE: 2.13 M2
CV ECHO LV RWT: 0.44 CM
DOP CALC AO PEAK VEL: 2.3 M/S
DOP CALC AO VTI: 51.3 CM
DOP CALC LVOT AREA: 3.8 CM2
DOP CALC LVOT DIAMETER: 2.2 CM
DOP CALC LVOT PEAK VEL: 1.4 M/S
DOP CALC LVOT STROKE VOLUME: 112.5 CM3
DOP CALC MV VTI: 45.5 CM
DOP CALCLVOT PEAK VEL VTI: 29.6 CM
E WAVE DECELERATION TIME: 307.85 MSEC
E/A RATIO: 0.98
E/E' RATIO: 17.86 M/S
ECHO LV POSTERIOR WALL: 1.1 CM (ref 0.6–1.1)
ESTIMATED AVG GLUCOSE: 111 MG/DL (ref 68–131)
FRACTIONAL SHORTENING: 48 % (ref 28–44)
HBA1C MFR BLD: 5.5 % (ref 4–5.6)
INTERVENTRICULAR SEPTUM: 0.9 CM (ref 0.6–1.1)
IVC DIAMETER: 2.13 CM
LA MAJOR: 5 CM
LA MINOR: 5.55 CM
LA WIDTH: 4.3 CM
LEFT ATRIUM SIZE: 4.42 CM
LEFT ATRIUM VOLUME INDEX: 41.7 ML/M2
LEFT ATRIUM VOLUME: 84.99 CM3
LEFT INTERNAL DIMENSION IN SYSTOLE: 2.6 CM (ref 2.1–4)
LEFT VENTRICLE DIASTOLIC VOLUME INDEX: 58.91 ML/M2
LEFT VENTRICLE DIASTOLIC VOLUME: 120.17 ML
LEFT VENTRICLE MASS INDEX: 89.2 G/M2
LEFT VENTRICLE SYSTOLIC VOLUME INDEX: 12 ML/M2
LEFT VENTRICLE SYSTOLIC VOLUME: 24.4 ML
LEFT VENTRICULAR INTERNAL DIMENSION IN DIASTOLE: 5 CM (ref 3.5–6)
LEFT VENTRICULAR MASS: 182 G
LV LATERAL E/E' RATIO: 15.63 M/S
LV SEPTAL E/E' RATIO: 20.83 M/S
LVED V (TEICH): 120.17 ML
LVES V (TEICH): 24.4 ML
LVOT MG: 3.81 MMHG
LVOT MV: 0.92 CM/S
MV MEAN GRADIENT: 3 MMHG
MV PEAK A VEL: 1.27 M/S
MV PEAK E VEL: 1.25 M/S
MV PEAK GRADIENT: 6 MMHG
MV STENOSIS PRESSURE HALF TIME: 89.28 MS
MV VALVE AREA BY CONTINUITY EQUATION: 2.47 CM2
MV VALVE AREA P 1/2 METHOD: 2.46 CM2
OHS CV RV/LV RATIO: 0.66 CM
PISA TR MAX VEL: 2.81 M/S
PV PEAK GRADIENT: 7 MMHG
PV PEAK VELOCITY: 1.32 M/S
RA MAJOR: 4.35 CM
RA PRESSURE ESTIMATED: 15 MMHG
RA WIDTH: 3.2 CM
RIGHT VENTRICLE DIASTOLIC BASEL DIMENSION: 3.3 CM
RIGHT VENTRICULAR END-DIASTOLIC DIMENSION: 3.29 CM
RV TB RVSP: 18 MMHG
RV TISSUE DOPPLER FREE WALL SYSTOLIC VELOCITY 1 (APICAL 4 CHAMBER VIEW): 11.6 CM/S
SINUS: 2.89 CM
STJ: 2.57 CM
TDI LATERAL: 0.08 M/S
TDI SEPTAL: 0.06 M/S
TDI: 0.07 M/S
TR MAX PG: 32 MMHG
TRICUSPID ANNULAR PLANE SYSTOLIC EXCURSION: 2.33 CM
TROPONIN I SERPL DL<=0.01 NG/ML-MCNC: 0.01 NG/ML (ref 0–0.03)
TROPONIN I SERPL DL<=0.01 NG/ML-MCNC: <0.006 NG/ML (ref 0–0.03)
TV REST PULMONARY ARTERY PRESSURE: 47 MMHG
Z-SCORE OF LEFT VENTRICULAR DIMENSION IN END DIASTOLE: -1.96
Z-SCORE OF LEFT VENTRICULAR DIMENSION IN END SYSTOLE: -2.85

## 2025-01-11 PROCEDURE — 93010 ELECTROCARDIOGRAM REPORT: CPT | Mod: ,,, | Performed by: INTERNAL MEDICINE

## 2025-01-11 PROCEDURE — 84484 ASSAY OF TROPONIN QUANT: CPT | Mod: 91

## 2025-01-11 PROCEDURE — 25000003 PHARM REV CODE 250

## 2025-01-11 PROCEDURE — 96376 TX/PRO/DX INJ SAME DRUG ADON: CPT

## 2025-01-11 PROCEDURE — 63600175 PHARM REV CODE 636 W HCPCS

## 2025-01-11 PROCEDURE — 36415 COLL VENOUS BLD VENIPUNCTURE: CPT

## 2025-01-11 PROCEDURE — 93005 ELECTROCARDIOGRAM TRACING: CPT

## 2025-01-11 PROCEDURE — 99204 OFFICE O/P NEW MOD 45 MIN: CPT | Mod: 25,,, | Performed by: INTERNAL MEDICINE

## 2025-01-11 PROCEDURE — 84484 ASSAY OF TROPONIN QUANT: CPT

## 2025-01-11 PROCEDURE — G0378 HOSPITAL OBSERVATION PER HR: HCPCS

## 2025-01-11 RX ORDER — GUAIFENESIN 100 MG/5ML
200 SOLUTION ORAL EVERY 4 HOURS PRN
Status: DISCONTINUED | OUTPATIENT
Start: 2025-01-11 | End: 2025-01-11 | Stop reason: HOSPADM

## 2025-01-11 RX ORDER — TALC
6 POWDER (GRAM) TOPICAL NIGHTLY PRN
Status: DISCONTINUED | OUTPATIENT
Start: 2025-01-11 | End: 2025-01-11 | Stop reason: HOSPADM

## 2025-01-11 RX ORDER — IBUPROFEN 200 MG
24 TABLET ORAL
Status: DISCONTINUED | OUTPATIENT
Start: 2025-01-11 | End: 2025-01-11 | Stop reason: HOSPADM

## 2025-01-11 RX ORDER — ONDANSETRON HYDROCHLORIDE 2 MG/ML
4 INJECTION, SOLUTION INTRAVENOUS EVERY 8 HOURS PRN
Status: DISCONTINUED | OUTPATIENT
Start: 2025-01-11 | End: 2025-01-11 | Stop reason: HOSPADM

## 2025-01-11 RX ORDER — POLYETHYLENE GLYCOL 3350 17 G/17G
17 POWDER, FOR SOLUTION ORAL DAILY
Status: DISCONTINUED | OUTPATIENT
Start: 2025-01-11 | End: 2025-01-11 | Stop reason: HOSPADM

## 2025-01-11 RX ORDER — NALOXONE HCL 0.4 MG/ML
0.02 VIAL (ML) INJECTION
Status: DISCONTINUED | OUTPATIENT
Start: 2025-01-11 | End: 2025-01-11 | Stop reason: HOSPADM

## 2025-01-11 RX ORDER — SODIUM CHLORIDE 0.9 % (FLUSH) 0.9 %
10 SYRINGE (ML) INJECTION EVERY 12 HOURS PRN
Status: DISCONTINUED | OUTPATIENT
Start: 2025-01-11 | End: 2025-01-11 | Stop reason: HOSPADM

## 2025-01-11 RX ORDER — ACETAMINOPHEN 325 MG/1
650 TABLET ORAL EVERY 4 HOURS PRN
Status: DISCONTINUED | OUTPATIENT
Start: 2025-01-11 | End: 2025-01-11 | Stop reason: HOSPADM

## 2025-01-11 RX ORDER — GLUCAGON 1 MG
1 KIT INJECTION
Status: DISCONTINUED | OUTPATIENT
Start: 2025-01-11 | End: 2025-01-11 | Stop reason: HOSPADM

## 2025-01-11 RX ORDER — IBUPROFEN 200 MG
16 TABLET ORAL
Status: DISCONTINUED | OUTPATIENT
Start: 2025-01-11 | End: 2025-01-11 | Stop reason: HOSPADM

## 2025-01-11 RX ORDER — BENZONATATE 100 MG/1
100 CAPSULE ORAL 3 TIMES DAILY PRN
Qty: 30 CAPSULE | Refills: 0 | Status: SHIPPED | OUTPATIENT
Start: 2025-01-11 | End: 2025-01-21

## 2025-01-11 RX ORDER — KETOROLAC TROMETHAMINE 30 MG/ML
15 INJECTION, SOLUTION INTRAMUSCULAR; INTRAVENOUS ONCE
Status: COMPLETED | OUTPATIENT
Start: 2025-01-11 | End: 2025-01-11

## 2025-01-11 RX ORDER — LIDOCAINE 50 MG/G
1 PATCH TOPICAL DAILY
Qty: 30 PATCH | Refills: 0 | Status: SHIPPED | OUTPATIENT
Start: 2025-01-11 | End: 2025-02-10

## 2025-01-11 RX ORDER — BENZONATATE 100 MG/1
100 CAPSULE ORAL 3 TIMES DAILY PRN
Status: DISCONTINUED | OUTPATIENT
Start: 2025-01-11 | End: 2025-01-11 | Stop reason: HOSPADM

## 2025-01-11 RX ADMIN — KETOROLAC TROMETHAMINE 15 MG: 30 INJECTION, SOLUTION INTRAMUSCULAR; INTRAVENOUS at 12:01

## 2025-01-11 RX ADMIN — HYDRALAZINE HYDROCHLORIDE 50 MG: 25 TABLET ORAL at 09:01

## 2025-01-11 RX ADMIN — ATORVASTATIN CALCIUM 80 MG: 40 TABLET, FILM COATED ORAL at 09:01

## 2025-01-11 RX ADMIN — CALCIUM CARBONATE (ANTACID) CHEW TAB 500 MG 1000 MG: 500 CHEW TAB at 09:01

## 2025-01-11 RX ADMIN — ASPIRIN 81 MG: 81 TABLET, COATED ORAL at 09:01

## 2025-01-11 RX ADMIN — ISOSORBIDE DINITRATE 10 MG: 10 TABLET ORAL at 02:01

## 2025-01-11 RX ADMIN — AMLODIPINE BESYLATE 10 MG: 5 TABLET ORAL at 09:01

## 2025-01-11 RX ADMIN — ISOSORBIDE DINITRATE 10 MG: 10 TABLET ORAL at 09:01

## 2025-01-11 RX ADMIN — LOSARTAN POTASSIUM 100 MG: 25 TABLET, FILM COATED ORAL at 09:01

## 2025-01-11 NOTE — NURSING
Pt arrived on unit, awake alert and oriented. IV site noted, saline lock. Pt on room air, no distress noted. Skin assessed and intact , swelling noted to LLE. Vitals assessed. Pt ambulated to bathroom independently, remains free from fall/injury. Pt on tele monitor #[id number]. Safety measures maintained. Will cont to monitor

## 2025-01-11 NOTE — H&P
[facility]  History & Physical    Patient Name: Carol Lauren  MRN: [id number]  Patient Class: OP- Observation  Admission Date: 1/10/2025  Attending Physician: Aubrey Lema MD   Primary Care Provider: Richard Locke MD         Patient information was obtained from patient, past medical records, and ER records.     Subjective:     Principal Problem:Other chest pain    Chief Complaint:   Chief Complaint   Patient presents with    Chest Pain    Shoulder Pain     Pt arrived in ED, c/o non traumatic left shoulder and CP since yesterday afternoon. Pt denies any SOB, abd pain or n/v/d        HPI: Carol Lauren is a 74 y.o. female with  BMI 38, Sedentary status, CHEVY on CPAP, HTN, and HLD who presented to [facility] ED on 01/11/2025 for eval and treatment of chest pain.  They describe their pain as sharp, reproducible, worse on inspiration, 8/10, located across her L chest without radiation.  It started the day before presentation and has been steady and constant since.  There is associated dry cough.  They deny associated dyspnea, fever, chills, palpitations, presyncope, or other complaint.  Symptoms are alleviated by Dilaudid given in ED.  No notable recent healthcare encounters.    ED course notable for the following: D-dimer elevated, Trop and BNP negative.  CTA PE negative but did show patchy bilateral lower lobe ground glass opacification.  Hypertensive but afebrile.  Exam with pain on palpation of her left ribs.  EKG without ST changes.  ED therapy/stabilization measures: .  They were placed in observation under the care of [facility] for further evaluation and treatment.      Past Medical History:   Diagnosis Date    Arthritis     Hyperlipidemia     Hypertension     Leukemia     Pulmonary HTN     Sleep apnea 2011    pt usuesa sleep apnea machine       Past Surgical History:   Procedure Laterality Date    bladder lift      BREAST SURGERY       CHOLECYSTECTOMY      HYSTERECTOMY      leukemia      TONSILLECTOMY      TOTAL HIP ARTHROPLASTY         Review of patient's allergies indicates:   Allergen Reactions    Amoxicillin Rash       No current facility-administered medications on file prior to encounter.     Current Outpatient Medications on File Prior to Encounter   Medication Sig    amlodipine (NORVASC) 10 MG tablet Take 10 mg by mouth once daily.    aspirin (ECOTRIN) 81 MG EC tablet Take 81 mg by mouth once daily.    calcium carbonate (OS-AARTI) 600 mg (1,500 mg) Tab Take 600 mg by mouth 2 (two) times daily with meals.    fish oil-omega-3 fatty acids 300-1,000 mg capsule Take 2 g by mouth once daily.    hydrALAZINE (APRESOLINE) 50 MG tablet Take 50 mg by mouth 2 (two) times daily.    isosorbide dinitrate (ISORDIL) 10 MG tablet Take 10 mg by mouth 3 (three) times daily.    losartan (COZAAR) 100 MG tablet Take 1 tablet by mouth once daily.    sertraline (ZOLOFT) 100 MG tablet Take 200 mg by mouth every evening.    atorvastatin (LIPITOR) 80 MG tablet Take 80 mg by mouth once daily.    zinc gluconate 50 mg tablet Take 1 tablet by mouth once daily.    [DISCONTINUED] propafenone (RHTHYMOL) 150 MG Tab Take 150 mg by mouth 2 (two) times daily.     Family History    None       Tobacco Use    Smoking status: Never    Smokeless tobacco: Never   Substance and Sexual Activity    Alcohol use: No    Drug use: No    Sexual activity: Not on file     Review of Systems   Reason unable to perform ROS: ROS was performed and pertinent +s and -s are listed in HPI.     Objective:     Vital Signs (Most Recent):  Temp: 98.3 °F (36.8 °C) (01/11/25 1146)  Pulse: 64 (01/11/25 1146)  Resp: 16 (01/11/25 1146)  BP: (!) 142/71 (01/11/25 1146)  SpO2: (!) 93 % (01/11/25 1146) Vital Signs (24h Range):  Temp:  [98.3 °F (36.8 °C)-99.3 °F (37.4 °C)] 98.3 °F (36.8 °C)  Pulse:  [56-75] 64  Resp:  [13-31] 16  SpO2:  [93 %-99 %] 93 %  BP: (121-182)/(58-89) 142/71     Weight: 100.3 kg (221 lb 1.9  oz)  Body mass index is 37.96 kg/m².     Physical Exam  Constitutional:       General: She is not in acute distress.     Appearance: Normal appearance. She is obese. She is not ill-appearing.   HENT:      Head: Normocephalic.   Neck:      Comments: JVP not elevated  Cardiovascular:      Rate and Rhythm: Normal rate and regular rhythm.      Pulses: Normal pulses.      Heart sounds: Normal heart sounds.   Pulmonary:      Effort: Pulmonary effort is normal.      Breath sounds: Normal breath sounds.   Chest:      Chest wall: Tenderness present.   Abdominal:      General: Abdomen is flat. Bowel sounds are normal.      Tenderness: There is no abdominal tenderness. There is no guarding.   Musculoskeletal:      Right lower leg: No edema.      Left lower leg: No edema.   Skin:     General: Skin is warm and dry.      Capillary Refill: Capillary refill takes less than 2 seconds.      Coloration: Skin is not jaundiced.   Neurological:      General: No focal deficit present.      Mental Status: She is alert and oriented to person, place, and time.   Psychiatric:         Mood and Affect: Mood normal.         Behavior: Behavior normal.                Significant Labs: All pertinent labs within the past 24 hours have been reviewed.  CBC:   Recent Labs   Lab 01/10/25  1410   WBC 5.83   HGB 11.1*   HCT 36.1*        CMP:   Recent Labs   Lab 01/10/25  1410      K 3.8   *   CO2 23      BUN 16   CREATININE 0.8   CALCIUM 9.7   PROT 6.8   ALBUMIN 3.2*   BILITOT 0.4   ALKPHOS 126   AST 15   ALT 16   ANIONGAP 8     Cardiac Markers:   Recent Labs   Lab 01/10/25  1410   BNP 99     Troponin:   Recent Labs   Lab 01/10/25  2143 01/11/25  0111 01/11/25  0410   TROPONINI <0.006 <0.006 0.008       Significant Imaging: I have reviewed all pertinent imaging results/findings within the past 24 hours.    Assessment/Plan:     * Other chest pain  Sxs not c/w classical angina, and EKG and Troponin (trending) aren't c/w ischemia.   Pain is reproducible, pt is coughing, and CTA w patchy bibasilar ground glass opacification.  Most recent nuclear stress test was negative.  Afebrile, WBC WNL.  Suspect viral URTI.    Place in Obs  Will hold off on full ACS protocol unless Trop or CP level bump overnight  Telemetry  TTE  Tylenol for pain  Antitussives      Obesity (BMI 35.0-39.9)  Body mass index is 37.96 kg/m². Morbid obesity complicates all aspects of disease management from diagnostic modalities to treatment. Weight loss encouraged and health benefits explained to patient.         CHEVY on CPAP  CPAP qhs      Hyperlipidemia  Continue home statin.      Essential hypertension  Patient's blood pressure range in the last 24 hours was: BP  Min: 121/58  Max: 182/74.The patient's inpatient anti-hypertensive regimen is listed below:  Current Antihypertensives  , 2 times daily, Oral  , 3 times daily, Oral  nitroGLYCERIN SL tablet 0.4 mg, Every 5 min PRN, Sublingual  amLODIPine tablet 10 mg, Daily, Oral  hydrALAZINE tablet 50 mg, 2 times daily, Oral  isosorbide dinitrate tablet 10 mg, 3 times daily, Oral  losartan tablet 100 mg, Daily, Oral    Plan  - BP is controlled, no changes needed to their regimen        VTE Risk Mitigation (From admission, onward)           Ordered     enoxaparin injection 40 mg  Daily         01/10/25 2017     IP VTE HIGH RISK PATIENT  Once         01/10/25 2017     Place sequential compression device  Until discontinued         01/10/25 2017                       On 01/11/2025, patient should be placed in hospital observation services under my care.             Saulo He MD  Department of Hospital Medicine  [facility] [place] [facility]

## 2025-01-11 NOTE — ASSESSMENT & PLAN NOTE
Predom L shoulder pain rad to chest, re[roducible and respirophasic, seems more musculoskel as opposed to vascular.  MPI 4/2023 apparently neg per pt report.  EKG nonischemic, neg trop.  Check echo.  Assuming no significant abnormalities, OK for discharge and follow up with Dr. Gupta.

## 2025-01-11 NOTE — SUBJECTIVE & OBJECTIVE
Past Medical History:   Diagnosis Date    Arthritis     Hyperlipidemia     Hypertension     Leukemia     Pulmonary HTN     Sleep apnea 2011    pt [facility]ues[facility] sleep apnea machine       Past Surgical History:   Procedure Laterality Date    bladder lift      BREAST SURGERY      CHOLECYSTECTOMY      HYSTERECTOMY      leukemia      TONSILLECTOMY      TOTAL HIP ARTHROPLASTY         Review of patient's allergies indicates:   Allergen Reactions    Amoxicillin Rash       No current facility-administered medications on file prior to encounter.     Current Outpatient Medications on File Prior to Encounter   Medication Sig    amlodipine (NORVASC) 10 MG tablet Take 10 mg by mouth once daily.    aspirin (ECOTRIN) 81 MG EC tablet Take 81 mg by mouth once daily.    calcium carbonate (OS-AARTI) 600 mg (1,500 mg) Tab Take 600 mg by mouth 2 (two) times daily with meals.    fish oil-omega-3 fatty acids 300-1,000 mg capsule Take 2 g by mouth once daily.    hydrALAZINE (APRESOLINE) 50 MG tablet Take 50 mg by mouth 2 (two) times daily.    isosorbide dinitrate (ISORDIL) 10 MG tablet Take 10 mg by mouth 3 (three) times daily.    losartan (COZAAR) 100 MG tablet Take 1 tablet by mouth once daily.    sertraline (ZOLOFT) 100 MG tablet Take 200 mg by mouth every evening.    atorvastatin (LIPITOR) 80 MG tablet Take 80 mg by mouth once daily.    zinc gluconate 50 mg tablet Take 1 tablet by mouth once daily.    [DISCONTINUED] propafenone (RHTHYMOL) 150 MG Tab Take 150 mg by mouth 2 (two) times daily.     Family History    None       Tobacco Use    Smoking status: Never    Smokeless tobacco: Never   Substance and Sexual Activity    Alcohol use: No    Drug use: No    Sexual activity: Not on file     Review of Systems   Constitutional: Negative for chills, diaphoresis, fever and malaise/fatigue.   HENT:  Negative for nosebleeds.    Eyes:  Negative for blurred vision and double vision.   Cardiovascular:  Positive for chest pain (reproducible). Negative for  claudication, cyanosis, dyspnea on exertion, leg swelling, orthopnea, palpitations, paroxysmal nocturnal dyspnea and syncope.   Respiratory:  Negative for cough, shortness of breath and wheezing.    Skin:  Negative for dry skin and poor wound healing.   Musculoskeletal:  Positive for joint pain (L shoulder). Negative for back pain, joint swelling and myalgias.   Gastrointestinal:  Negative for abdominal pain, nausea and vomiting.   Genitourinary:  Negative for hematuria.   Neurological:  Negative for dizziness, headaches, numbness, seizures and weakness.   Psychiatric/Behavioral:  Negative for altered mental status and depression.      Objective:     Vital Signs (Most Recent):  Temp: 98.3 °F (36.8 °C) (01/11/25 0738)  Pulse: 61 (01/11/25 0738)  Resp: 19 (01/11/25 0738)  BP: 124/73 (01/11/25 0738)  SpO2: (!) 93 % (01/11/25 0738) Vital Signs (24h Range):  Temp:  [98.3 °F (36.8 °C)-99.3 °F (37.4 °C)] 98.3 °F (36.8 °C)  Pulse:  [56-75] 61  Resp:  [13-31] 19  SpO2:  [93 %-99 %] 93 %  BP: (121-182)/(58-89) 124/73     Weight: 100.3 kg (221 lb 1.9 oz)  Body mass index is 37.96 kg/m².    SpO2: (!) 93 %         Intake/Output Summary (Last 24 hours) at 1/11/2025 0953  Last data filed at 1/11/2025 0900  Gross per 24 hour   Intake 120 ml   Output --   Net 120 ml       Lines/Drains/Airways       Peripheral Intravenous Line  Duration                  Peripheral IV - Single Lumen 01/10/25 1546 20 G Right Antecubital <1 day                     Physical Exam  Constitutional:       General: She is not in acute distress.     Appearance: She is well-developed. She is obese. She is not ill-appearing, toxic-appearing or diaphoretic.   HENT:      Head: Normocephalic and atraumatic.   Eyes:      General: No scleral icterus.     Extraocular Movements: Extraocular movements intact.      Conjunctiva/sclera: Conjunctivae normal.      Pupils: Pupils are equal, round, and reactive to light.   Neck:      Vascular: No JVD.      Trachea: No tracheal  deviation.   Cardiovascular:      Rate and Rhythm: Normal rate and regular rhythm.      Heart sounds: S1 normal and S2 normal. No murmur heard.     No friction rub. No gallop.   Pulmonary:      Effort: Pulmonary effort is normal. No respiratory distress.      Breath sounds: Normal breath sounds. No stridor. No wheezing, rhonchi or rales.   Chest:      Chest wall: Tenderness present.   Abdominal:      General: There is no distension.      Palpations: Abdomen is soft.   Musculoskeletal:         General: No swelling or tenderness. Normal range of motion.      Cervical back: Normal range of motion and neck supple. No rigidity.      Right lower leg: No edema.      Left lower leg: No edema.   Skin:     General: Skin is warm and dry.      Coloration: Skin is not jaundiced.   Neurological:      General: No focal deficit present.      Mental Status: She is alert and oriented to person, place, and time.      Cranial Nerves: No cranial nerve deficit.   Psychiatric:         Mood and Affect: Mood normal.         Behavior: Behavior normal.          Current Medications:   amLODIPine  10 mg Oral Daily    aspirin  81 mg Oral Daily    atorvastatin  80 mg Oral Daily    calcium carbonate  1,000 mg Oral BID    enoxparin  40 mg Subcutaneous Daily    hydrALAZINE  50 mg Oral BID    isosorbide dinitrate  10 mg Oral TID    LIDOcaine  1 patch Transdermal Q24H    losartan  100 mg Oral Daily    sertraline  200 mg Oral QHS         Current Facility-Administered Medications:     nitroGLYCERIN, 0.4 mg, Sublingual, Q5 Min PRN    Laboratory (all labs reviewed):  CBC:  Recent Labs   Lab 11/09/24  1207 01/10/25  1410   WBC 8.60 5.83   Hemoglobin 12.5 11.1 L   Hematocrit 39.2 36.1 L   Platelets 216 238       CHEMISTRIES:  Recent Labs   Lab 11/10/23  1341 02/15/24  1252 11/09/24  1207 12/16/24  1242 01/10/25  1410   Glucose 100 H 85 109 87 103   Sodium 143 143 145 142 143   Potassium 4.0 4.7 4.0 4.4 3.8   BUN  --   --  16  --  16   Blood Urea Nitrogen 22  20  --  19  --    Creatinine 0.80 0.91 1.0 0.94 0.8   eGFR 77 66 59 A 63 >60   Calcium 9.6 9.4 9.4 9.7 9.7       CARDIAC BIOMARKERS:  Recent Labs   Lab 01/10/25  1410 01/10/25  1708 01/10/25  2143 01/11/25  0111 01/11/25  0410   Troponin I <0.006 0.006 <0.006 <0.006 0.008       COAGS:  Recent Labs   Lab 05/16/23  1339 05/17/23  1416 01/10/25  2143   INR 0.9 1.0 0.9       LIPIDS/LFTS:  Recent Labs   Lab 05/19/23  1217 02/15/24  1252 11/09/24  1207 12/16/24  1242 01/10/25  1410 01/10/25  2143   Cholesterol  --   --   --   --   --  133   Triglycerides  --   --   --   --   --  101   HDL  --   --   --   --   --  46   LDL Cholesterol  --   --   --   --   --  66.8   Non-HDL Cholesterol  --   --   --   --   --  87   AST 15 18 15 15 15  --    ALT 25 15 16 15 16  --        BNP:  Recent Labs   Lab 01/10/25  1410   BNP 99       TSH:        Free T4:        Diagnostic Results:  ECG (personally reviewed and interpreted tracing(s)):  1/11/25 0533 SR 62, RBBB    Chest X-Ray (personally reviewed and interpreted image(s)): 1/10/25 NAD    CTA Chest 1/10/25  1. No evidence of PE.  2. Mild bilateral lower lobe atelectatic changes and patchy ground-glass density.  No focal consolidation.    Echo: ordered    Per Dr. Gupta note 12/4/24:  09/10/2024 Carotid  Right: There is a 0-39% diameter reduction of the right ICA. There is antegrade flow in the right vertebral artery.  Left: There is a 0-39% diameter reduction of the left ICA. There is antegrade flow in the left vertebral artery.    04/06/23 NST ?ISCHEMIA  Stress is negative for arrhythmias  ECG is negative with baseline abnormalities  Myocardial perfusion imaging was performed using a same day, single isotope imaging protocol with the intravenous (right antecubital fossa) injection of 7.8mCi of Tc-99m sestamibi at rest. Approximately 30 minutes following the IVadministration of Tc-99m sestamibi, rest imaging was performed with patient in a supine position, arms away from side and  chest. The patient was injected intravenously (right antecubital fossa) with 31.1mCi of Tc-99m sestamibi following Lexiscan rapid infusion, and imaging was begun approximately 45 minutes later with patient in a supine position, arms away from side and chest. Imaging was performed by gated tomographic technique.  LVEF 78%

## 2025-01-11 NOTE — HPI
74-year-old female with a past medical history of arthritis, hypertension, pulmonary hypertension, hyperlipidemia, leukemia in remission now presents with a chief complaint of chest and shoulder pain. Patient reports that this morning she woke up and had severe left shoulder pain associated with neck pain and chest wall pain on her left side. He reports that she has arthritis and has had similar pains over right shoulder but it is now affecting her left shoulder. Patient reports that taking a deep breath causes crampy pain of her left chest wall. Patient denies any numbness or weakness of her affected extremity but endorses pain. Patient addition tells me that her legs were bilaterally swollen yesterday but improved since. Patient denies any headaches, vision changes, numbness/weakness, abdominal pain, nausea, or vomiting.     Follows with Dr. Gupta, last seen 12/2024.    Cardiology consulted for CP.    The patient is a pleasant 74-year-old woman with no prior coronary history presenting with left shoulder pain.  This is made worse with abduction.  There was some radiation to the chest.  She describes some respirophasic symptoms which have since abated.  There was some reproducibility to the chest pain.  Her EKG notes an old right bundle-branch block without ischemic changes.  Her troponins are negative.  Nuclear stress test in April 2023 as reported below does not suggest ischemia, although this is not explicitly stated.  The patient tells me that her stress test was normal.  Echocardiogram has been ordered.  Given the atypical/noncardiac nature of her symptoms, assuming no significant abnormalities on the echocardiogram, I do not feel any further inpatient cardiac testing is required.

## 2025-01-11 NOTE — SUBJECTIVE & OBJECTIVE
Past Medical History:   Diagnosis Date    Arthritis     Hyperlipidemia     Hypertension     Leukemia     Pulmonary HTN     Sleep apnea 2011    pt [facility]uesa sleep apnea machine       Past Surgical History:   Procedure Laterality Date    bladder lift      BREAST SURGERY      CHOLECYSTECTOMY      HYSTERECTOMY      leukemia      TONSILLECTOMY      TOTAL HIP ARTHROPLASTY         Review of patient's allergies indicates:   Allergen Reactions    Amoxicillin Rash       No current facility-administered medications on file prior to encounter.     Current Outpatient Medications on File Prior to Encounter   Medication Sig    amlodipine (NORVASC) 10 MG tablet Take 10 mg by mouth once daily.    aspirin (ECOTRIN) 81 MG EC tablet Take 81 mg by mouth once daily.    calcium carbonate (OS-AARTI) 600 mg (1,500 mg) Tab Take 600 mg by mouth 2 (two) times daily with meals.    fish oil-omega-3 fatty acids 300-1,000 mg capsule Take 2 g by mouth once daily.    hydrALAZINE (APRESOLINE) 50 MG tablet Take 50 mg by mouth 2 (two) times daily.    isosorbide dinitrate (ISORDIL) 10 MG tablet Take 10 mg by mouth 3 (three) times daily.    losartan (COZAAR) 100 MG tablet Take 1 tablet by mouth once daily.    sertraline (ZOLOFT) 100 MG tablet Take 200 mg by mouth every evening.    atorvastatin (LIPITOR) 80 MG tablet Take 80 mg by mouth once daily.    zinc gluconate 50 mg tablet Take 1 tablet by mouth once daily.    [DISCONTINUED] propafenone (RHTHYMOL) 150 MG Tab Take 150 mg by mouth 2 (two) times daily.     Family History    None       Tobacco Use    Smoking status: Never    Smokeless tobacco: Never   Substance and Sexual Activity    Alcohol use: No    Drug use: No    Sexual activity: Not on file     Review of Systems   Reason unable to perform ROS: ROS was performed and pertinent +s and -s are listed in HPI.     Objective:     Vital Signs (Most Recent):  Temp: 98.3 °F (36.8 °C) (01/11/25 1146)  Pulse: 64 (01/11/25 1146)  Resp: 16 (01/11/25 1146)  BP: (!)  142/71 (01/11/25 1146)  SpO2: (!) 93 % (01/11/25 1146) Vital Signs (24h Range):  Temp:  [98.3 °F (36.8 °C)-99.3 °F (37.4 °C)] 98.3 °F (36.8 °C)  Pulse:  [56-75] 64  Resp:  [13-31] 16  SpO2:  [93 %-99 %] 93 %  BP: (121-182)/(58-89) 142/71     Weight: 100.3 kg (221 lb 1.9 oz)  Body mass index is 37.96 kg/m².     Physical Exam  Constitutional:       General: She is not in acute distress.     Appearance: Normal appearance. She is obese. She is not ill-appearing.   HENT:      Head: Normocephalic.   Neck:      Comments: JVP not elevated  Cardiovascular:      Rate and Rhythm: Normal rate and regular rhythm.      Pulses: Normal pulses.      Heart sounds: Normal heart sounds.   Pulmonary:      Effort: Pulmonary effort is normal.      Breath sounds: Normal breath sounds.   Chest:      Chest wall: Tenderness present.   Abdominal:      General: Abdomen is flat. Bowel sounds are normal.      Tenderness: There is no abdominal tenderness. There is no guarding.   Musculoskeletal:      Right lower leg: No edema.      Left lower leg: No edema.   Skin:     General: Skin is warm and dry.      Capillary Refill: Capillary refill takes less than 2 seconds.      Coloration: Skin is not jaundiced.   Neurological:      General: No focal deficit present.      Mental Status: She is alert and oriented to person, place, and time.   Psychiatric:         Mood and Affect: Mood normal.         Behavior: Behavior normal.                Significant Labs: All pertinent labs within the past 24 hours have been reviewed.  CBC:   Recent Labs   Lab 01/10/25  1410   WBC 5.83   HGB 11.1*   HCT 36.1*        CMP:   Recent Labs   Lab 01/10/25  1410      K 3.8   *   CO2 23      BUN 16   CREATININE 0.8   CALCIUM 9.7   PROT 6.8   ALBUMIN 3.2*   BILITOT 0.4   ALKPHOS 126   AST 15   ALT 16   ANIONGAP 8     Cardiac Markers:   Recent Labs   Lab 01/10/25  1410   BNP 99     Troponin:   Recent Labs   Lab 01/10/25  2143 01/11/25  0111  01/11/25  0410   TROPONINI <0.006 <0.006 0.008       Significant Imaging: I have reviewed all pertinent imaging results/findings within the past 24 hours.

## 2025-01-11 NOTE — HPI
Carol Lauren is a 74 y.o. female with  BMI 38, Sedentary status, CHEVY on CPAP, HTN, and HLD who presented to [facility] ED on 01/10/2025 for eval and treatment of chest pain.  They describe their pain as sharp, reproducible, worse on inspiration, 8/10, located across her L chest without radiation.  It started the day before presentation and has been steady and constant since.  There is associated dry cough.  They deny associated dyspnea, fever, chills, palpitations, presyncope, or other complaint.  Symptoms are alleviated by Dilaudid given in ED.  No notable recent healthcare encounters.    ED course notable for the following: D-dimer elevated, Trop and BNP negative.  CTA PE negative but did show patchy bilateral lower lobe ground glass opacification.  Hypertensive but afebrile.  Exam with pain on palpation of her left ribs.  EKG without ST changes.  ED therapy/stabilization measures: .  They were placed in observation under the care of [facility] for further evaluation and treatment.

## 2025-01-11 NOTE — NURSING
[facility]  Nurses Note -- 4 Eyes      1/11/2025       Skin assessed on: Admit      [x] No Pressure Injuries Present    []Prevention Measures Documented    [] Yes LDA  for Pressure Injury Previously documented     [] Yes New Pressure Injury Discovered   [] LDA for New Pressure Injury Added      Attending RN:  MOOK Gilbert    Second RN:  MOOK Artis

## 2025-01-11 NOTE — CONSULTS
[facility] Surg  Cardiology  Consult Note    Patient Name: Carol Lauren  MRN: [id number]  Admission Date: 1/10/2025  Hospital Length of Stay: 0 days  Code Status: Full Code   Attending Provider: Juanjose Solis, *   Consulting Provider: Pio Grayson MD  Primary Care Physician: Richard Locke MD  Principal Problem:Other chest pain    Patient information was obtained from patient and ER records.     Inpatient consult to Cardiology  Consult performed by: Pio Grayson MD  Consult ordered by: Denae Morrissey PA-C  Reason for consult: CP        Subjective:     Chief Complaint:  L shoulder pain     HPI:   74-year-old female with a past medical history of arthritis, hypertension, pulmonary hypertension, hyperlipidemia, leukemia in remission now presents with a chief complaint of chest and shoulder pain. Patient reports that this morning she woke up and had severe left shoulder pain associated with neck pain and chest wall pain on her left side. He reports that she has arthritis and has had similar pains over right shoulder but it is now affecting her left shoulder. Patient reports that taking a deep breath causes crampy pain of her left chest wall. Patient denies any numbness or weakness of her affected extremity but endorses pain. Patient addition tells me that her legs were bilaterally swollen yesterday but improved since. Patient denies any headaches, vision changes, numbness/weakness, abdominal pain, nausea, or vomiting.     Follows with Dr. Gupta, last seen 12/2024.    Cardiology consulted for CP.    The patient is a pleasant 74-year-old woman with no prior coronary history presenting with left shoulder pain.  This is made worse with abduction.  There was some radiation to the chest.  She describes some respirophasic symptoms which have since abated.  There was some reproducibility to the chest pain.  Her EKG notes an old right bundle-branch block without ischemic changes.  Her  troponins are negative.  Nuclear stress test in April 2023 as reported below does not suggest ischemia, although this is not explicitly stated.  The patient tells me that her stress test was normal.  Echocardiogram has been ordered.  Given the atypical/noncardiac nature of her symptoms, assuming no significant abnormalities on the echocardiogram, I do not feel any further inpatient cardiac testing is required.    Past Medical History:   Diagnosis Date    Arthritis     Hyperlipidemia     Hypertension     Leukemia     Pulmonary HTN     Sleep apnea 2011    pt usuesa sleep apnea machine       Past Surgical History:   Procedure Laterality Date    bladder lift      BREAST SURGERY      CHOLECYSTECTOMY      HYSTERECTOMY      leukemia      TONSILLECTOMY      TOTAL HIP ARTHROPLASTY         Review of patient's allergies indicates:   Allergen Reactions    Amoxicillin Rash       No current facility-administered medications on file prior to encounter.     Current Outpatient Medications on File Prior to Encounter   Medication Sig    amlodipine (NORVASC) 10 MG tablet Take 10 mg by mouth once daily.    aspirin (ECOTRIN) 81 MG EC tablet Take 81 mg by mouth once daily.    calcium carbonate (OS-AARTI) 600 mg (1,500 mg) Tab Take 600 mg by mouth 2 (two) times daily with meals.    fish oil-omega-3 fatty acids 300-1,000 mg capsule Take 2 g by mouth once daily.    hydrALAZINE (APRESOLINE) 50 MG tablet Take 50 mg by mouth 2 (two) times daily.    isosorbide dinitrate (ISORDIL) 10 MG tablet Take 10 mg by mouth 3 (three) times daily.    losartan (COZAAR) 100 MG tablet Take 1 tablet by mouth once daily.    sertraline (ZOLOFT) 100 MG tablet Take 200 mg by mouth every evening.    atorvastatin (LIPITOR) 80 MG tablet Take 80 mg by mouth once daily.    zinc gluconate 50 mg tablet Take 1 tablet by mouth once daily.    [DISCONTINUED] propafenone (RHTHYMOL) 150 MG Tab Take 150 mg by mouth 2 (two) times daily.     Family History    None       Tobacco Use     Smoking status: Never    Smokeless tobacco: Never   Substance and Sexual Activity    Alcohol use: No    Drug use: No    Sexual activity: Not on file     Review of Systems   Constitutional: Negative for chills, diaphoresis, fever and malaise/fatigue.   HENT:  Negative for nosebleeds.    Eyes:  Negative for blurred vision and double vision.   Cardiovascular:  Positive for chest pain (reproducible). Negative for claudication, cyanosis, dyspnea on exertion, leg swelling, orthopnea, palpitations, paroxysmal nocturnal dyspnea and syncope.   Respiratory:  Negative for cough, shortness of breath and wheezing.    Skin:  Negative for dry skin and poor wound healing.   Musculoskeletal:  Positive for joint pain (L shoulder). Negative for back pain, joint swelling and myalgias.   Gastrointestinal:  Negative for abdominal pain, nausea and vomiting.   Genitourinary:  Negative for hematuria.   Neurological:  Negative for dizziness, headaches, numbness, seizures and weakness.   Psychiatric/Behavioral:  Negative for altered mental status and depression.      Objective:     Vital Signs (Most Recent):  Temp: 98.3 °F (36.8 °C) (01/11/25 0738)  Pulse: 61 (01/11/25 0738)  Resp: 19 (01/11/25 0738)  BP: 124/73 (01/11/25 0738)  SpO2: (!) 93 % (01/11/25 0738) Vital Signs (24h Range):  Temp:  [98.3 °F (36.8 °C)-99.3 °F (37.4 °C)] 98.3 °F (36.8 °C)  Pulse:  [56-75] 61  Resp:  [13-31] 19  SpO2:  [93 %-99 %] 93 %  BP: (121-182)/(58-89) 124/73     Weight: 100.3 kg (221 lb 1.9 oz)  Body mass index is 37.96 kg/m².    SpO2: (!) 93 %         Intake/Output Summary (Last 24 hours) at 1/11/2025 0953  Last data filed at 1/11/2025 0900  Gross per 24 hour   Intake 120 ml   Output --   Net 120 ml       Lines/Drains/Airways       Peripheral Intravenous Line  Duration                  Peripheral IV - Single Lumen 01/10/25 1546 20 G Right Antecubital <1 day                     Physical Exam  Constitutional:       General: She is not in acute distress.      Appearance: She is well-developed. She is obese. She is not ill-appearing, toxic-appearing or diaphoretic.   HENT:      Head: Normocephalic and atraumatic.   Eyes:      General: No scleral icterus.     Extraocular Movements: Extraocular movements intact.      Conjunctiva/sclera: Conjunctivae normal.      Pupils: Pupils are equal, round, and reactive to light.   Neck:      Vascular: No JVD.      Trachea: No tracheal deviation.   Cardiovascular:      Rate and Rhythm: Normal rate and regular rhythm.      Heart sounds: S1 normal and S2 normal. No murmur heard.     No friction rub. No gallop.   Pulmonary:      Effort: Pulmonary effort is normal. No respiratory distress.      Breath sounds: Normal breath sounds. No stridor. No wheezing, rhonchi or rales.   Chest:      Chest wall: Tenderness present.   Abdominal:      General: There is no distension.      Palpations: Abdomen is soft.   Musculoskeletal:         General: No swelling or tenderness. Normal range of motion.      Cervical back: Normal range of motion and neck supple. No rigidity.      Right lower leg: No edema.      Left lower leg: No edema.   Skin:     General: Skin is warm and dry.      Coloration: Skin is not jaundiced.   Neurological:      General: No focal deficit present.      Mental Status: She is alert and oriented to person, place, and time.      Cranial Nerves: No cranial nerve deficit.   Psychiatric:         Mood and Affect: Mood normal.         Behavior: Behavior normal.          Current Medications:   amLODIPine  10 mg Oral Daily    aspirin  81 mg Oral Daily    atorvastatin  80 mg Oral Daily    calcium carbonate  1,000 mg Oral BID    enoxparin  40 mg Subcutaneous Daily    hydrALAZINE  50 mg Oral BID    isosorbide dinitrate  10 mg Oral TID    LIDOcaine  1 patch Transdermal Q24H    losartan  100 mg Oral Daily    sertraline  200 mg Oral QHS         Current Facility-Administered Medications:     nitroGLYCERIN, 0.4 mg, Sublingual, Q5 Min  PRN    Laboratory (all labs reviewed):  CBC:  Recent Labs   Lab 11/09/24  1207 01/10/25  1410   WBC 8.60 5.83   Hemoglobin 12.5 11.1 L   Hematocrit 39.2 36.1 L   Platelets 216 238       CHEMISTRIES:  Recent Labs   Lab 11/10/23  1341 02/15/24  1252 11/09/24  1207 12/16/24  1242 01/10/25  1410   Glucose 100 H 85 109 87 103   Sodium 143 143 145 142 143   Potassium 4.0 4.7 4.0 4.4 3.8   BUN  --   --  16  --  16   Blood Urea Nitrogen 22 20 -- 19  --    Creatinine 0.80 0.91 1.0 0.94 0.8   eGFR 77 66 59 A 63 >60   Calcium 9.6 9.4 9.4 9.7 9.7       CARDIAC BIOMARKERS:  Recent Labs   Lab 01/10/25  1410 01/10/25  1708 01/10/25  2143 01/11/25  0111 01/11/25  0410   Troponin I <0.006 0.006 <0.006 <0.006 0.008       COAGS:  Recent Labs   Lab 05/16/23  1339 05/17/23  1416 01/10/25  2143   INR 0.9 1.0 0.9       LIPIDS/LFTS:  Recent Labs   Lab 05/19/23  1217 02/15/24  1252 11/09/24  1207 12/16/24  1242 01/10/25  1410 01/10/25  2143   Cholesterol  --   --   --   --   --  133   Triglycerides  --   --   --   --   --  101   HDL  --   --   --   --   --  46   LDL Cholesterol  --   --   --   --   --  66.8   Non-HDL Cholesterol  --   --   --   --   --  87   AST 15 18 15 15 15  --    ALT 25 15 16 15 16  --        BNP:  Recent Labs   Lab 01/10/25  1410   BNP 99       TSH:        Free T4:        Diagnostic Results:  ECG (personally reviewed and interpreted tracing(s)):  1/11/25 0533 SR 62, RBBB    Chest X-Ray (personally reviewed and interpreted image(s)): 1/10/25 NAD    CTA Chest 1/10/25  1. No evidence of PE.  2. Mild bilateral lower lobe atelectatic changes and patchy ground-glass density.  No focal consolidation.    Echo: ordered    Per Dr. Gupta note 12/4/24:  09/10/2024 Carotid  Right: There is a 0-39% diameter reduction of the right ICA. There is antegrade flow in the right vertebral artery.  Left: There is a 0-39% diameter reduction of the left ICA. There is antegrade flow in the left vertebral artery.    04/06/23 NST  ?ISCHEMIA  Stress is negative for arrhythmias  ECG is negative with baseline abnormalities  Myocardial perfusion imaging was performed using a same day, single isotope imaging protocol with the intravenous (right antecubital fossa) injection of 7.8mCi of Tc-99m sestamibi at rest. Approximately 30 minutes following the IVadministration of Tc-99m sestamibi, rest imaging was performed with patient in a supine position, arms away from side and chest. The patient was injected intravenously (right antecubital fossa) with 31.1mCi of Tc-99m sestamibi following Lexiscan rapid infusion, and imaging was begun approximately 45 minutes later with patient in a supine position, arms away from side and chest. Imaging was performed by gated tomographic technique.  LVEF 78%     Assessment and Plan:     * Other chest pain  Predom L shoulder pain rad to chest, re[roducible and respirophasic, seems more musculoskel as opposed to vascular.  MPI 4/2023 apparently neg per pt report.  EKG nonischemic, neg trop.  Check echo.  Assuming no significant abnormalities, OK for discharge and follow up with Dr. Gupta.    Obesity (BMI 35.0-39.9)  Body mass index is 37.96 kg/m². Morbid obesity complicates all aspects of disease management from diagnostic modalities to treatment. Weight loss encouraged and health benefits explained to patient.         Hyperlipidemia  Cont statin      Essential hypertension  Cont med rx        VTE Risk Mitigation (From admission, onward)           Ordered     enoxaparin injection 40 mg  Daily         01/10/25 2017     IP VTE HIGH RISK PATIENT  Once         01/10/25 2017     Place sequential compression device  Until discontinued         01/10/25 2017                    Thank you for your consult. I will follow-up with patient. Please contact us if you have any additional questions.    Pio Grayson MD  Cardiology   [facility] - Med Surg    Addendum 330pm:    Echo 1/11/25 (images personally reviewed and  interpreted)    Left Ventricle: The left ventricle is normal in size. Normal wall thickness. There is concentric remodeling. There is normal systolic function with a visually estimated ejection fraction of 60 - 65%. Grade I diastolic dysfunction.    Right Ventricle: Normal right ventricular cavity size. Systolic function is normal.    Pulmonary Artery: The estimated pulmonary artery systolic pressure is 47 mmHg.    No further inpat cardiac testing planned  Cardiology will sign off, pls call with questions.  Pt to follow up with Dr. Gupta.

## 2025-01-11 NOTE — DISCHARGE SUMMARY
[facility] Hospital Medicine  Discharge Summary      Patient Name: Carol Lauren  MRN: [id number]  NANCY: [id number]  Patient Class: OP- Observation  Admission Date: 1/10/2025  Hospital Length of Stay: 0 days  Discharge Date and Time: 1/11/2025 3:40 PM  Attending Physician: Aubrey Lema MD   Discharging Provider: Denae Morrissey PA-C  Primary Care Provider: Richard Locke MD    Primary Care Team:  Hosp Med LIT 2    HPI:   Carol Lauren is a 74 y.o. female with  BMI 38, Sedentary status, CHEVY on CPAP, HTN, and HLD who presented to [facility] ED on 01/10/2025 for eval and treatment of chest pain.  They describe their pain as sharp, reproducible, worse on inspiration, 8/10, located across her L chest without radiation.  It started the day before presentation and has been steady and constant since.  There is associated dry cough.  They deny associated dyspnea, fever, chills, palpitations, presyncope, or other complaint.  Symptoms are alleviated by Dilaudid given in ED.  No notable recent healthcare encounters.    ED course notable for the following: D-dimer elevated, Trop and BNP negative.  CTA PE negative but did show patchy bilateral lower lobe ground glass opacification.  Hypertensive but afebrile.  Exam with pain on palpation of her left ribs.  EKG without ST changes.  ED therapy/stabilization measures: .  They were placed in observation under the care of [facility] for further evaluation and treatment.      * No surgery found *      Hospital Course:   Carol Lauren is a 74 y.o. with a pmh of CHEVY on CPAP, hypertension, hyperlipidemia, obesity placed under observation for complaints of nontraumatic left shoulder pain and left-sided chest pain since Thursday evening 01/09/2025. ED course notable for the following: D-dimer elevated, Trop and BNP negative. CTA PE negative but did show patchy bilateral lower lobe ground glass opacification. Hypertensive but  afebrile. Exam with pain on palpation of her left ribs. EKG without ST changes. ED therapy/stabilization measures: .   Patient complains of left shoulder pain exacerbated with movement.  X-ray left shoulder with no evidence of acute displaced fracture, dislocation, destructive process with mild degenerative changes seen the AC joint.  Patient with elevated D-dimer 0.92.  CTA chest ordered which is negative for PE and patchy bibasilar ground-glass opacification.  Patient complains of slightly productive cough with congestion and postnasal drip.  Patient afebrile and labs unremarkable for leukocytosis.  Suspect viral upper respiratory infection and will treat symptomatically.  Patient also complains of left-sided chest pain mostly exacerbated by palpation and breathing.  EKG without ischemic changes and troponin trend unrevealing.  Echocardiogram ordered and Cardiology consulted due to heart score of 5.  Patient follows with Dr. Gupta (Cardiology) at [facility].  Echocardiogram showed normal systolic function 60-65%, grade 1 DD, and PASP 47 mmHg.  Cardiologist suspects chest is left shoulder pain radiating chest and reproducible likely more musculoskeletal as opposed to vascular.  EKG with no evidence of ischemia and troponin trend negative.  Cardiology stated patient can continue her home medications and follow up with Dr. Gupta, her cardiologist at [facility].  Patient states she has difficulty with range of motion of her left shoulder.  Lidocaine patch ordered for pain.  Patient was also prescribed Tessalon for cough.  Patient hemodynamically stable and medically cleared for discharge.  Patient acknowledged understanding of what was stated above and had no further questions.  Return precautions given.    All findings and plan were explained to the patient. All questions and concerns were answered. Patient verbalized understanding. Patient is in stable condition to d/c home and has  been informed to follow up with her PCP within the next 7-10 days to discuss her observation stay and to follow-up with Cardiology at [facility] with Dr. Gupta. Patient has been educated to return to the ED if She experiences any further chest pain, sob, n/v/d, fever/chills, abdominal pain, lightheadness, weakness, or discomfort.       Goals of Care Treatment Preferences:  Code Status: Full Code      SDOH Screening:  The patient declined to be screened for utility difficulties, food insecurity, transport difficulties, housing insecurity, and interpersonal safety, so no concerns could be identified this admission.     Consults:   Consults (From admission, onward)          Status Ordering Provider     Inpatient consult to Cardiology  Once        Provider:  Pio Grayson MD    Completed IMANI VENTURA     Inpatient consult to Registered Dietitian/Nutritionist  Once        Provider:  (Not yet assigned)    Completed JULIANO FINK     Inpatient consult to Social Work/Case Management  Once        Provider:  (Not yet assigned)    Completed JULIANO FINK          Final Active Diagnoses:    Diagnosis Date Noted POA    PRINCIPAL PROBLEM:  Other chest pain [R07.89] 01/11/2025 Yes    Essential hypertension [I10] 09/14/2015 Yes     Chronic    Hyperlipidemia [E78.5] 09/14/2015 Yes     Chronic    Obesity (BMI 35.0-39.9) [E66.9] 09/14/2015 Yes     Chronic    CHEVY on CPAP [G47.33] 09/14/2015 Yes     Chronic      Problems Resolved During this Admission:       Discharged Condition: stable    Disposition: Home or Self Care    Follow Up:   Follow-up Information       Richard Locke MD. Schedule an appointment as soon as possible for a visit.    Specialty: Family Medicine  Why: Out-Patient Primary Care Hospital Follow-up needed in 1 week  Contact information:  [address] [place] [address]  [phone]               Ian Gupta MD. Schedule an appointment as soon as possible for a visit.    Why:  Out-Patient Primary Care Hospital Follow-up needed in 1 week  Contact information:  [facility] [id number]   [place] [address]   Phone: [phone]   Fax: [phone]                         Patient Instructions:      Diet Cardiac     Activity as tolerated       Significant Diagnostic Studies: Labs: CMP   Recent Labs   Lab 01/10/25  1410      K 3.8   *   CO2 23      BUN 16   CREATININE 0.8   CALCIUM 9.7   PROT 6.8   ALBUMIN 3.2*   BILITOT 0.4   ALKPHOS 126   AST 15   ALT 16   ANIONGAP 8    and CBC   Recent Labs   Lab 01/10/25  1410   WBC 5.83   HGB 11.1*   HCT 36.1*          Results for orders placed during the hospital encounter of 01/10/25    Echo    Interpretation Summary    Left Ventricle: The left ventricle is normal in size. Normal wall thickness. There is concentric remodeling. There is normal systolic function with a visually estimated ejection fraction of 60 - 65%. Grade I diastolic dysfunction.    Right Ventricle: Normal right ventricular cavity size. Systolic function is normal.    Pulmonary Artery: The estimated pulmonary artery systolic pressure is 47 mmHg.        Pending Diagnostic Studies:       Procedure Component Value Units Date/Time    EKG 12-lead [[id number]]     Order Status: Sent Lab Status: No result     Hemoglobin A1c [[id number]] Collected: 01/10/25 2143    Order Status: Sent Lab Status: No result     Specimen: Blood            Medications:  Reconciled Home Medications:      Medication List        START taking these medications      benzonatate 100 MG capsule  Commonly known as: TESSALON  Take 1 capsule (100 mg total) by mouth 3 (three) times daily as needed for Cough.     LIDOcaine 5 %  Commonly known as: LIDODERM  Place 1 patch onto the skin once daily. Remove & Discard patch within 12 hours or as directed by MD            CONTINUE taking these medications      amLODIPine 10 MG tablet  Commonly known as:  NORVASC  Take 10 mg by mouth once daily.     aspirin 81 MG EC tablet  Commonly known as: ECOTRIN  Take 81 mg by mouth once daily.     atorvastatin 80 MG tablet  Commonly known as: LIPITOR  Take 80 mg by mouth once daily.     calcium carbonate 600 mg calcium (1,500 mg) Tab  Commonly known as: OS-AARTI  Take 600 mg by mouth 2 (two) times daily with meals.     fish oil-omega-3 fatty acids 300-1,000 mg capsule  Take 2 g by mouth once daily.     hydrALAZINE 50 MG tablet  Commonly known as: APRESOLINE  Take 50 mg by mouth 2 (two) times daily.     isosorbide dinitrate 10 MG tablet  Commonly known as: ISORDIL  Take 10 mg by mouth 3 (three) times daily.     losartan 100 MG tablet  Commonly known as: COZAAR  Take 1 tablet by mouth once daily.     sertraline 100 MG tablet  Commonly known as: ZOLOFT  Take 200 mg by mouth every evening.     zinc gluconate 50 mg tablet  Take 1 tablet by mouth once daily.              Indwelling Lines/Drains at time of discharge:   Lines/Drains/Airways       None                   Time spent on the discharge of patient: 45 minutes         Denae Morrissey PA-C  Department of Hospital Medicine  [facility]

## 2025-01-11 NOTE — PLAN OF CARE
Problem: Adult Inpatient Plan of Care  Goal: Plan of Care Review  Outcome: Progressing  Flowsheets (Taken 1/11/2025 1755)  Plan of Care Reviewed With: patient  Goal: Absence of Hospital-Acquired Illness or Injury  Outcome: Progressing  Intervention: Identify and Manage Fall Risk  Flowsheets (Taken 1/11/2025 1755)  Safety Promotion/Fall Prevention:   assistive device/personal item within reach   bed alarm set   medications reviewed   nonskid shoes/socks when out of bed   instructed to call staff for mobility   Fall Risk reviewed with patient/family   room near unit station  Goal: Optimal Comfort and Wellbeing  Outcome: Progressing  Intervention: Monitor Pain and Promote Comfort  Flowsheets (Taken 1/11/2025 1755)  Pain Management Interventions:   care clustered   pillow support provided   relaxation techniques promoted   quiet environment facilitated   pain management plan reviewed with patient/caregiver  Intervention: Provide Person-Centered Care  Flowsheets (Taken 1/11/2025 1755)  Trust Relationship/Rapport:   care explained   choices provided   emotional support provided   empathic listening provided   questions answered   questions encouraged   reassurance provided   thoughts/feelings acknowledged

## 2025-01-11 NOTE — PLAN OF CARE
----- Message from Jeromy Tena sent at 8/9/2023 10:07 AM CDT -----  Contact: Delvis Edmondson would like a call back at [phone] in regards to needing to speak with nurse about get refill son al his medications until his appointment with the doctor.  Thanks        Case Management Final Discharge Note      Discharge Disposition: Home    New DME ordered / company name: None    Relevant SDOH / Transition of Care Barriers:  None identified    Person available to provide assistance at home when needed and their contact information: Joey Lauren [phone]    Scheduled followup appointment: Instructions placed on AVS for PCP and cardiology follow up.    Referrals placed: None    Transportation: private vehicle    Patient educated on discharge services and updated on DC plan. Bedside RN notified, patient clear to discharge from Case Management Perspective.      01/11/25 1530   Final Note   Assessment Type Final Discharge Note   Anticipated Discharge Disposition Home   Hospital Resources/Appts/Education Provided Provided patient/caregiver with written discharge plan information   Post-Acute Status   Discharge Delays None known at this time

## 2025-01-11 NOTE — CONSULTS
Pt admitted with chest pain. Consult received for NSTEMI. RD working remotely for weekend coverage. Cardiac diet handouts attached to d/c paperwork. Inpatient RD to follow up with diet education.

## 2025-01-11 NOTE — ASSESSMENT & PLAN NOTE
Body mass index is 37.96 kg/m². Morbid obesity complicates all aspects of disease management from diagnostic modalities to treatment. Weight loss encouraged and health benefits explained to patient.

## 2025-01-11 NOTE — ASSESSMENT & PLAN NOTE
Patient's blood pressure range in the last 24 hours was: BP  Min: 121/58  Max: 182/74.The patient's inpatient anti-hypertensive regimen is listed below:  Current Antihypertensives  , 2 times daily, Oral  , 3 times daily, Oral  nitroGLYCERIN SL tablet 0.4 mg, Every 5 min PRN, Sublingual  amLODIPine tablet 10 mg, Daily, Oral  hydrALAZINE tablet 50 mg, 2 times daily, Oral  isosorbide dinitrate tablet 10 mg, 3 times daily, Oral  losartan tablet 100 mg, Daily, Oral    Plan  - BP is controlled, no changes needed to their regimen

## 2025-01-11 NOTE — PLAN OF CARE
Case Management Brief Assessment     PCP: Richard Locke MD    Pharmacy:   [facility]/pharmacy #[id number] - [place], [place] - [address]  [address] [address]  Phone: [phone] Fax: [phone]      Patient Arrived From: Home  Existing Help at Home: spouse    Barriers to Discharge: Home    Discharge Plan:    A. Home with family   B. Home    Patient lives at home with her  and is independent with ADLs. Only DME is a CPAP that's old and she's trying to obtain a new one from a company that accepts her insurance. Her family will provide transportation home on discharge.      01/11/25 0830   Discharge Planning   Assessment Type Discharge Planning Brief Assessment   Support Systems Spouse/significant other   Assistance Needed None   Equipment Currently Used at Home CPAP   Current Living Arrangements home   DME Needed Upon Discharge  none   Discharge Plan A Home with family   Discharge Plan B Home

## 2025-01-11 NOTE — NURSING
Pt called complaining of pain and nausea. Called and notified MD, new orders given for Norco and Zofran; medication given. EKG done. Pt sitting on side of bed. Safety measures maintained. Will cont to monitor.

## 2025-01-11 NOTE — ASSESSMENT & PLAN NOTE
Sxs not c/w classical angina, and EKG and Troponin (trending) aren't c/w ischemia.  Pain is reproducible, pt is coughing, and CTA w patchy bibasilar ground glass opacification.  Most recent nuclear stress test was negative.  Afebrile, WBC WNL.  Suspect viral URTI.    Place in Obs  Will hold off on full ACS protocol unless Trop or CP level bump overnight  Telemetry  TTE  Tylenol for pain  Antitussives

## 2025-01-12 LAB
OHS QRS DURATION: 138 MS
OHS QRS DURATION: 148 MS
OHS QTC CALCULATION: 452 MS
OHS QTC CALCULATION: 475 MS

## 2025-03-06 ENCOUNTER — TELEPHONE (OUTPATIENT)
Dept: OTOLARYNGOLOGY | Facility: CLINIC | Age: 75
End: 2025-03-06
Payer: MEDICARE

## 2025-03-06 NOTE — TELEPHONE ENCOUNTER
----- Message from Jeni sent at 3/6/2025 12:47 PM CST -----  Regarding: Appt  Contact: [phone]  Type:  Needs ApptWho Called: ChristineSymptoms (please be specific): Wax buildup Would the patient rather a call back or a response via [facility]? callBest Call Back Number:  [phone] Information: Patient asked to see Dr Carranza

## 2025-04-08 ENCOUNTER — OFFICE VISIT (OUTPATIENT)
Dept: OTOLARYNGOLOGY | Facility: CLINIC | Age: 75
End: 2025-04-08
Payer: MEDICARE

## 2025-04-08 VITALS
DIASTOLIC BLOOD PRESSURE: 76 MMHG | BODY MASS INDEX: 37.75 KG/M2 | SYSTOLIC BLOOD PRESSURE: 146 MMHG | HEIGHT: 64 IN | WEIGHT: 221.13 LBS

## 2025-04-08 DIAGNOSIS — H61.21 IMPACTED CERUMEN OF RIGHT EAR: Primary | ICD-10-CM

## 2025-04-08 RX ORDER — FUROSEMIDE 20 MG/1
20 TABLET ORAL
COMMUNITY

## 2025-04-08 NOTE — PROGRESS NOTES
"OTOLARYNGOLOGY CLINIC NOTE  Date:  04/08/2025     Chief complaint:  Cerumen impaction    History of Present Illness  Carol Lauren is a 74 y.o. female  presenting today for a new evaluation and treatment of cerumen impaction.  Pt reports having cerumen impaction in both ears.  Pt reports on yesterday the left ear cerumen fell out on its own but she continues to feel it on the right side.  Pt reports having to have her ears cleaned regularly.  Pt reports she could not tolerate her PCP cleaning her ears so she was referred here for cleaning.  Pt denies any otalgia, otorrhea, or pressure    Past Medical History  Past Medical History:   Diagnosis Date    Arthritis     Hyperlipidemia     Hypertension     Leukemia     Pulmonary HTN     Sleep apnea 2011    pt usuesa sleep apnea machine      Past Surgical History  Past Surgical History:   Procedure Laterality Date    bladder lift      BREAST SURGERY      CHOLECYSTECTOMY      HYSTERECTOMY      leukemia      TONSILLECTOMY      TOTAL HIP ARTHROPLASTY        Medications  Medications Ordered Prior to Encounter[1]    Review of Systems  Review of Systems   Constitutional: Negative.    Eyes: Negative.    Respiratory: Negative.     Cardiovascular: Negative.    Skin: Negative.       Social History   reports that she has never smoked. She has never used smokeless tobacco. She reports that she does not drink alcohol and does not use drugs.     Family History  No family history on file.     Physical Exam   Vitals:    04/08/25 1440   BP: (!) 146/76    Body mass index is 37.96 kg/m².  Weight: 100.3 kg (221 lb 1.9 oz)   Height: 5' 4" (162.6 cm)     GENERAL: no acute distress.  HEAD: normocephalic.   EYES: lids and lashes normal. No scleral icterus  EARS: external ear without lesion, normal pinna shape and position.  Left external auditory canal with normal cerumen, tympanic membrane fully visible, no perforation , no retraction. No middle ear effusion.   Right external auditory canal " with cerumen impaction.   NOSE: external nose without significant bony abnormality  ORAL CAVITY/OROPHARYNX: tongue midline and mobile. Symmetric palate rise.   NECK: trachea midline.   LYMPH NODES:No cervical lymphadenopathy.  RESPIRATORY: no stridor, no stertor. Voice normal. Respirations nonlabored.  NEURO: alert, responds to questions appropriately.   PSYCH:mood appropriate    Procedure Note   Procedure performed: microscopic examination of ears with cerumen disimpaction    Indication for procedure: unilateral cerumen impaction     Description of procedure:  After verbal consent was obtained, the patient was positioned in semi recumbent position and speculum was placed in the right /  left  ear and microscope brought into the field.  The microscope was positioned and magnification adjusted for appropriate visualization. Otologic instruments including various size otologic suctions and curette were used to remove the cerumen from the right external auditory canals under visualization with the operating microscope. After cleaning, visualization was again performed and at various levels of higher magnification to optimize views of the ear canal, tympanic membrane, ossicles and middle ear space. Findings as indicated below. All portions of the procedure and examination by otomicroscopy were tolerated well without complication.     Findings:  Right complete cerumen impaction removed entirely revealing normal external auditory canal; tympanic membrane without bulging, retraction, or perforation; no evidence of middle ear fluid or effusion.     Imaging:  The patient does not have any pertinent and/or recent imaging of the head and neck.     Labs:  CBC  Recent Labs   Lab 11/09/24  1207 01/10/25  1410 02/26/25  1423   WBC 8.60 5.83 6.5   Hemoglobin 12.5 11.1 L 11.9 L   Hematocrit 39.2 36.1 L 34.8 L   MCV 91 88 84.5   Platelets 216 238  --      BMP  Recent Labs   Lab 11/09/24  1207 12/16/24  1242 01/10/25  1410  02/26/25  1423   Glucose 109 87 103  --    Sodium 145 142 143 142   Potassium 4.0 4.4 3.8 3.9   Chloride 110  --  112 H  --    CO2 24  --  23  --    Carbon Dioxide  --  29  --  26   BUN 16  --  16 16.0   Blood Urea Nitrogen  --  19  --   --    Creatinine 1.0 0.94 0.8 0.80   Calcium 9.4 9.7 9.7 9.6     COAGS  Recent Labs   Lab 05/16/23  1339 05/17/23  1416 01/10/25  2143   INR 0.9 1.0 0.9     Assessment  1. Impacted cerumen of right ear     Plan:  Cerumen Impaction:  We discussed preventative measures and treatment options. Q-tips must be avoided, instead the ears can be cleaned with OTC ear rinses (or Debrox).   Discussed plan of care with patient in detail and all questions answered. Patient reported understanding of plan of care.        [1]   Current Outpatient Medications on File Prior to Visit   Medication Sig Dispense Refill    amlodipine (NORVASC) 10 MG tablet Take 10 mg by mouth once daily.      aspirin (ECOTRIN) 81 MG EC tablet Take 81 mg by mouth once daily.      atorvastatin (LIPITOR) 80 MG tablet Take 80 mg by mouth once daily.      calcium carbonate (OS-AARTI) 600 mg (1,500 mg) Tab Take 600 mg by mouth 2 (two) times daily with meals.      fish oil-omega-3 fatty acids 300-1,000 mg capsule Take 2 g by mouth once daily.      furosemide (LASIX) 20 MG tablet Take 20 mg by mouth.      hydrALAZINE (APRESOLINE) 50 MG tablet Take 50 mg by mouth 2 (two) times daily.      isosorbide dinitrate (ISORDIL) 10 MG tablet Take 10 mg by mouth 3 (three) times daily.      losartan (COZAAR) 100 MG tablet Take 1 tablet by mouth once daily.      sertraline (ZOLOFT) 100 MG tablet Take 200 mg by mouth every evening.      zinc gluconate 50 mg tablet Take 1 tablet by mouth once daily.      [DISCONTINUED] propafenone (RHTHYMOL) 150 MG Tab Take 150 mg by mouth 2 (two) times daily.       No current facility-administered medications on file prior to visit.